# Patient Record
Sex: FEMALE | Race: WHITE | Employment: UNEMPLOYED | URBAN - METROPOLITAN AREA
[De-identification: names, ages, dates, MRNs, and addresses within clinical notes are randomized per-mention and may not be internally consistent; named-entity substitution may affect disease eponyms.]

---

## 2018-10-13 ENCOUNTER — HOSPITAL ENCOUNTER (OUTPATIENT)
Age: 23
Discharge: HOME | End: 2018-10-13
Attending: EMERGENCY MEDICINE
Payer: MEDICAID

## 2018-10-13 VITALS
DIASTOLIC BLOOD PRESSURE: 68 MMHG | TEMPERATURE: 98 F | WEIGHT: 166 LBS | BODY MASS INDEX: 28.34 KG/M2 | RESPIRATION RATE: 18 BRPM | SYSTOLIC BLOOD PRESSURE: 124 MMHG | OXYGEN SATURATION: 99 % | HEIGHT: 64 IN | HEART RATE: 70 BPM

## 2018-10-13 DIAGNOSIS — L01.00 IMPETIGO: Primary | ICD-10-CM

## 2018-10-13 DIAGNOSIS — H66.92 ACUTE LEFT OTITIS MEDIA: ICD-10-CM

## 2018-10-13 PROCEDURE — 99214 OFFICE O/P EST MOD 30 MIN: CPT

## 2018-10-13 PROCEDURE — 99213 OFFICE O/P EST LOW 20 MIN: CPT

## 2018-10-13 PROCEDURE — 81025 URINE PREGNANCY TEST: CPT

## 2018-10-13 RX ORDER — CLINDAMYCIN HYDROCHLORIDE 300 MG/1
300 CAPSULE ORAL 3 TIMES DAILY
Qty: 30 CAPSULE | Refills: 0 | Status: SHIPPED | OUTPATIENT
Start: 2018-10-13 | End: 2018-10-23

## 2018-10-13 RX ORDER — PREDNISONE 20 MG/1
40 TABLET ORAL DAILY
Qty: 6 TABLET | Refills: 0 | Status: SHIPPED | OUTPATIENT
Start: 2018-10-13 | End: 2018-10-16

## 2018-10-13 NOTE — ED PROVIDER NOTES
Patient Seen in: [facility]    History   Patient presents with:  Ear Pain  Cough    Stated Complaint: COUGH/EAR PAIN    HPI    The patient is a 70-year-old female with history of mild asthma, environmental allergies, comp air)       Current:/68   Pulse 70   Temp 98.2 °F (36.8 °C) (Oral)   Resp 18   Ht 162.6 cm (5' 4\")   Wt 75.3 kg   LMP 09/22/2018   SpO2 99%   Breastfeeding?  No   BMI 28.49 kg/m²         Physical Exam    Alert female no acute distress  HEENT: Normocep daily for 10 days. Qty: 30 capsule Refills: 0    predniSONE 20 MG Oral Tab  Take 2 tablets (40 mg total) by mouth daily for 3 days.   Qty: 6 tablet Refills: 0

## 2018-10-13 NOTE — ED INITIAL ASSESSMENT (HPI)
Pt to IC with cough x 2 weeks. Productive at times. Denies fever. Denies N/V/D. States he has developed clogged sensation in her left ear with left sided nasal congestion. Dr Lexie Greene at bedside.

## 2019-02-13 ENCOUNTER — HOSPITAL ENCOUNTER (OUTPATIENT)
Age: 24
Discharge: ANOTHER HOSPITAL | End: 2019-02-13
Attending: EMERGENCY MEDICINE
Payer: MEDICAID

## 2019-02-13 VITALS
BODY MASS INDEX: 26.46 KG/M2 | SYSTOLIC BLOOD PRESSURE: 145 MMHG | HEART RATE: 102 BPM | RESPIRATION RATE: 18 BRPM | DIASTOLIC BLOOD PRESSURE: 79 MMHG | TEMPERATURE: 98 F | WEIGHT: 155 LBS | OXYGEN SATURATION: 100 % | HEIGHT: 64 IN

## 2019-02-13 DIAGNOSIS — K04.7 DENTAL ABSCESS: Primary | ICD-10-CM

## 2019-02-13 PROCEDURE — 99212 OFFICE O/P EST SF 10 MIN: CPT

## 2019-02-14 ENCOUNTER — HOSPITAL ENCOUNTER (EMERGENCY)
Facility: HOSPITAL | Age: 24
Discharge: HOME | End: 2019-02-14
Attending: EMERGENCY MEDICINE
Payer: MEDICAID

## 2019-02-14 VITALS
HEART RATE: 88 BPM | SYSTOLIC BLOOD PRESSURE: 133 MMHG | DIASTOLIC BLOOD PRESSURE: 79 MMHG | OXYGEN SATURATION: 99 % | TEMPERATURE: 98 F | RESPIRATION RATE: 18 BRPM

## 2019-02-14 DIAGNOSIS — K04.7 DENTAL ABSCESS: Primary | ICD-10-CM

## 2019-02-14 PROCEDURE — 0C96XZZ DRAINAGE OF LOWER GINGIVA, EXTERNAL APPROACH: ICD-10-PCS | Performed by: EMERGENCY MEDICINE

## 2019-02-14 PROCEDURE — 41800 DRAINAGE OF GUM LESION: CPT

## 2019-02-14 PROCEDURE — 99284 EMERGENCY DEPT VISIT MOD MDM: CPT

## 2019-02-14 RX ORDER — HYDROCODONE BITARTRATE AND ACETAMINOPHEN 5; 325 MG/1; MG/1
2 TABLET ORAL ONCE
Status: COMPLETED | OUTPATIENT
Start: 2019-02-14 | End: 2019-02-14

## 2019-02-14 RX ORDER — HYDROCODONE BITARTRATE AND ACETAMINOPHEN 5; 325 MG/1; MG/1
1-2 TABLET ORAL EVERY 6 HOURS PRN
Qty: 10 TABLET | Refills: 0 | Status: SHIPPED | OUTPATIENT
Start: 2019-02-14 | End: 2019-04-05

## 2019-02-14 NOTE — ED PROVIDER NOTES
Patient Seen in: [facility] In [address] [place] [address]    History   Patient presents with:  Dental Problem (dental)    Stated Complaint: mouth swelling    HPI    Patient is a 27-year-old female with a history of impacted left lower wisdom tooth, h of the left lower molars  Pharynx:  left parapharyngeal swelling, uvula midline, no drooling, no stridor,  Patient has 2 cm trismus  Neck: Supple without palpable adenopathy or masses  Skin: No acute rash        ED Course   Labs Reviewed - No data to displ

## 2019-02-14 NOTE — ED INITIAL ASSESSMENT (HPI)
SAW DENTIST THIS AM AND TOLD SHE NEED ORAL SURG FOR IMPACTED WISDOM TOOTH PUT ON CLINDAMYCIN  AND T OOK MOTRIN BUT FEELS IT GETTING WORSE SCHEDULED FOR SURG ON Friday. ICE PACK TO LEFT JAW. LARGE SWELLING  LEFT JAW HARD PAIN UP TO EAR.

## 2019-02-15 ENCOUNTER — HOSPITAL ENCOUNTER (EMERGENCY)
Facility: HOSPITAL | Age: 24
Discharge: HOME | End: 2019-02-15
Attending: PHYSICIAN ASSISTANT
Payer: MEDICAID

## 2019-02-15 VITALS
OXYGEN SATURATION: 97 % | RESPIRATION RATE: 18 BRPM | TEMPERATURE: 98 F | SYSTOLIC BLOOD PRESSURE: 118 MMHG | HEART RATE: 76 BPM | DIASTOLIC BLOOD PRESSURE: 60 MMHG

## 2019-02-15 DIAGNOSIS — K91.840 SURGICAL WOUND HEMORRHAGE AFTER DENTAL PROCEDURE: Primary | ICD-10-CM

## 2019-02-15 DIAGNOSIS — Z98.818 SURGICAL WOUND HEMORRHAGE AFTER DENTAL PROCEDURE: Primary | ICD-10-CM

## 2019-02-15 PROCEDURE — 99282 EMERGENCY DEPT VISIT SF MDM: CPT

## 2019-02-15 NOTE — ED INITIAL ASSESSMENT (HPI)
Patient was seen yesterday was seen by the dentist for a wisdom tooth growing in that touching a nerve. Was given clidamycin. Went to IC last night as the swelling continued to get worse. The doctor tried to drain the swelling last night but was unable to.

## 2019-02-15 NOTE — ED PROVIDER NOTES
Patient Seen in: [facility] Emergency Department    History   Patient presents with:  Cellulitis (integumentary, infectious)      HPI    The patient presents to the ED complaining of worsening swelling to her left cheek and pain due to a tooth inf Oral   SpO2 98 %   O2 Device None (Room air)       Physical Exam   Constitutional: She is oriented to person, place, and time. She appears well-developed and well-nourished. No distress. HENT:   Head: Normocephalic and atraumatic.    Significant swelling aspiration performed and patient stable for discharge home with continued oral antibiotic use and follow-up with her orthodontist tomorrow. Procedure:  Abscess drainage:   The patient's abscess was located along her left lower mandible adjacent to her lo

## 2019-02-15 NOTE — ED INITIAL ASSESSMENT (HPI)
Pt states she just had dental procedure done at [facility] today and on her way home, she began bleeding and \"may have popped a stitch. \"

## 2019-02-16 NOTE — ED PROVIDER NOTES
Patient Seen in: [facility] Emergency Department    History   Patient presents with:  Dental Problem (dental)    Stated Complaint: dental complaint    CHELSIE    Naye Jones is a 24year old female who presents with chief complaint of oral bleed tobacco: Never Used    Alcohol use: No    Drug use: No      Review of Systems    Positive for stated complaint: dental complaint  Other systems are as noted in HPI. Constitutional and vital signs reviewed.       All other systems reviewed and negative exce movement is intact in all 4 extremities. Patient exhibits normal speech. Skin: Skin is normal to inspection, except as documented. Warm and dry. No obvious bruising. No obvious rash.     ED Course   Labs Reviewed - No data to display    MDM     Procedu

## 2019-02-16 NOTE — ED NOTES
Patient here after being at [facility] for dental surgery. Patient states she had a drain put in and that that she thinks she popped a stitch. Patient is afraid that she pulled out her drain.

## 2019-04-05 PROCEDURE — 88175 CYTOPATH C/V AUTO FLUID REDO: CPT | Performed by: FAMILY MEDICINE

## 2021-02-01 DIAGNOSIS — Z23 NEED FOR VACCINATION: ICD-10-CM

## 2021-02-12 ENCOUNTER — IMMUNIZATION (OUTPATIENT)
Dept: LAB | Facility: HOSPITAL | Age: 26
End: 2021-02-12
Attending: HOSPITALIST
Payer: COMMERCIAL

## 2021-02-12 DIAGNOSIS — Z23 NEED FOR VACCINATION: Primary | ICD-10-CM

## 2021-02-12 PROCEDURE — 0011A SARSCOV2 VAC 100MCG/0.5ML IM: CPT

## 2021-03-13 ENCOUNTER — IMMUNIZATION (OUTPATIENT)
Dept: LAB | Facility: HOSPITAL | Age: 26
End: 2021-03-13
Attending: EMERGENCY MEDICINE
Payer: COMMERCIAL

## 2021-03-13 DIAGNOSIS — Z23 NEED FOR VACCINATION: Primary | ICD-10-CM

## 2021-03-13 PROCEDURE — 0012A SARSCOV2 VAC 100MCG/0.5ML IM: CPT

## 2025-05-31 ENCOUNTER — HOSPITAL ENCOUNTER (INPATIENT)
Facility: HOSPITAL | Age: 30
LOS: 3 days | Discharge: HOME | End: 2025-06-03
Attending: EMERGENCY MEDICINE | Admitting: INTERNAL MEDICINE
Payer: COMMERCIAL

## 2025-05-31 ENCOUNTER — APPOINTMENT (OUTPATIENT)
Dept: CT IMAGING | Facility: HOSPITAL | Age: 30
End: 2025-05-31
Attending: EMERGENCY MEDICINE
Payer: COMMERCIAL

## 2025-05-31 ENCOUNTER — APPOINTMENT (OUTPATIENT)
Dept: MRI IMAGING | Facility: HOSPITAL | Age: 30
End: 2025-05-31
Attending: EMERGENCY MEDICINE
Payer: COMMERCIAL

## 2025-05-31 ENCOUNTER — APPOINTMENT (OUTPATIENT)
Dept: GENERAL RADIOLOGY | Facility: HOSPITAL | Age: 30
End: 2025-05-31
Attending: EMERGENCY MEDICINE
Payer: COMMERCIAL

## 2025-05-31 DIAGNOSIS — R56.9 SEIZURE (HCC): ICD-10-CM

## 2025-05-31 DIAGNOSIS — R41.82 ALTERED MENTAL STATUS, UNSPECIFIED ALTERED MENTAL STATUS TYPE: ICD-10-CM

## 2025-05-31 DIAGNOSIS — I61.9 CEREBRAL HEMORRHAGE (HCC): Primary | ICD-10-CM

## 2025-05-31 LAB
ALBUMIN SERPL-MCNC: 5.4 G/DL (ref 3.2–4.8)
ALBUMIN/GLOB SERPL: 1.9 {RATIO} (ref 1–2)
ALP LIVER SERPL-CCNC: 60 U/L (ref 37–98)
ALT SERPL-CCNC: 74 U/L (ref 10–49)
AMPHET UR QL SCN: NEGATIVE
ARTERIAL PATENCY WRIST A: POSITIVE
ARTERIAL PATENCY WRIST A: POSITIVE
AST SERPL-CCNC: 76 U/L (ref ?–34)
ATRIAL RATE: 118 BPM
BASE EXCESS BLDA CALC-SCNC: -7.5 MMOL/L (ref ?–2)
BASOPHILS # BLD: 0.19 X10(3) UL (ref 0–0.2)
BASOPHILS NFR BLD: 1 %
BENZODIAZ UR QL SCN: NEGATIVE
BILIRUB SERPL-MCNC: 0.3 MG/DL (ref 0.3–1.2)
BILIRUB UR QL STRIP.AUTO: NEGATIVE
BODY TEMPERATURE: 98.6 F
BODY TEMPERATURE: <70 F
BUN BLD-MCNC: 13 MG/DL (ref 9–23)
CA-I BLD-SCNC: 1.33 MMOL/L (ref 0.95–1.32)
CALCIUM BLD-MCNC: 10.3 MG/DL (ref 8.7–10.6)
CHLORIDE SERPL-SCNC: 105 MMOL/L (ref 98–112)
CLARITY UR REFRACT.AUTO: CLEAR
CO2 SERPL-SCNC: <10 MMOL/L (ref 21–32)
COCAINE UR QL: NEGATIVE
COHGB MFR BLD: 2.7 % SAT (ref 0–3)
COHGB MFR BLD: 4 % SAT (ref 0–3)
CREAT BLD-MCNC: 1.36 MG/DL (ref 0.55–1.02)
CREAT UR-SCNC: 53.6 MG/DL
EGFRCR SERPLBLD CKD-EPI 2021: 54 ML/MIN/1.73M2 (ref 60–?)
EOSINOPHIL # BLD: 0.58 X10(3) UL (ref 0–0.7)
EOSINOPHIL NFR BLD: 3 %
ERYTHROCYTE [DISTWIDTH] IN BLOOD BY AUTOMATED COUNT: 12.5 %
FENTANYL UR QL SCN: NEGATIVE
FIO2: 100 %
FIO2: 40 %
GLOBULIN PLAS-MCNC: 2.9 G/DL (ref 2–3.5)
GLUCOSE BLD-MCNC: 189 MG/DL (ref 70–99)
GLUCOSE BLD-MCNC: 269 MG/DL (ref 70–99)
GLUCOSE BLD-MCNC: 84 MG/DL (ref 70–99)
GLUCOSE UR STRIP.AUTO-MCNC: NORMAL MG/DL
HCO3 BLDA-SCNC: 19 MEQ/L (ref 21–27)
HCT VFR BLD AUTO: 53.8 % (ref 35–48)
HGB BLD-MCNC: 15.2 G/DL (ref 12–16)
HGB BLD-MCNC: 16.1 G/DL (ref 12–16)
HGB BLD-MCNC: 16.2 G/DL (ref 12–16)
KETONES UR STRIP.AUTO-MCNC: NEGATIVE MG/DL
LACTATE BLD-SCNC: 4.1 MMOL/L (ref 0.5–2)
LACTATE BLD-SCNC: >16.5 MMOL/L (ref 0.5–2)
LEUKOCYTE ESTERASE UR QL STRIP.AUTO: NEGATIVE
LYMPHOCYTES NFR BLD: 43 %
LYMPHOCYTES NFR BLD: 8.3 X10(3) UL (ref 1–4)
MCH RBC QN AUTO: 30.9 PG (ref 26–34)
MCHC RBC AUTO-ENTMCNC: 30.1 G/DL (ref 31–37)
MCV RBC AUTO: 102.5 FL (ref 80–100)
MDMA UR QL SCN: NEGATIVE
METHGB MFR BLD: 0.3 % SAT (ref 0.4–1.5)
METHGB MFR BLD: 0.6 % SAT (ref 0.4–1.5)
MONOCYTES # BLD: 0.77 X10(3) UL (ref 0.1–1)
MONOCYTES NFR BLD: 4 %
MORPHOLOGY: NORMAL
NEUTROPHILS # BLD AUTO: 7.73 X10 (3) UL (ref 1.5–7.7)
NEUTROPHILS NFR BLD: 44 %
NEUTS BAND NFR BLD: 5 %
NEUTS HYPERSEG # BLD: 9.46 X10(3) UL (ref 1.5–7.7)
NITRITE UR QL STRIP.AUTO: NEGATIVE
OPIATES UR QL SCN: NEGATIVE
OSMOLALITY SERPL CALC.SUM OF ELEC: 306 MOSM/KG (ref 275–295)
OXYCODONE UR QL SCN: NEGATIVE
OXYHGB MFR BLDA: 95.7 % (ref 92–100)
OXYHGB MFR BLDA: 95.8 % (ref 92–100)
P AXIS: 45 DEGREES
P-R INTERVAL: 180 MS
PCO2 BLDA: 37 MM HG (ref 35–45)
PCO2 BLDA: 46 MM HG (ref 35–45)
PEEP: 5 CM H2O
PEEP: 5 CM H2O
PH BLDA: 7.3 [PH] (ref 7.35–7.45)
PH BLDA: <6.81 [PH] (ref 7.35–7.45)
PH UR STRIP.AUTO: 7 [PH] (ref 5–8)
PLATELET # BLD AUTO: 375 10(3)UL (ref 150–450)
PLATELET MORPHOLOGY: NORMAL
PO2 BLDA: 115 MM HG (ref 80–100)
PO2 BLDA: 443 MM HG (ref 80–100)
POTASSIUM BLD-SCNC: 4.8 MMOL/L (ref 3.6–5.1)
POTASSIUM SERPL-SCNC: 4 MMOL/L (ref 3.5–5.1)
PROT SERPL-MCNC: 8.3 G/DL (ref 5.7–8.2)
PROT UR STRIP.AUTO-MCNC: 30 MG/DL
Q-T INTERVAL: 304 MS
QRS DURATION: 90 MS
QTC CALCULATION (BEZET): 426 MS
R AXIS: -18 DEGREES
RBC # BLD AUTO: 5.25 X10(6)UL (ref 3.8–5.3)
SODIUM BLD-SCNC: 134 MMOL/L (ref 135–145)
SODIUM SERPL-SCNC: 143 MMOL/L (ref 136–145)
SP GR UR STRIP.AUTO: 1.02 (ref 1–1.03)
T AXIS: 102 DEGREES
TIDAL VOLUME: 500 ML
TIDAL VOLUME: 500 ML
TOTAL CELLS COUNTED BLD: 100
TROPONIN I SERPL HS-MCNC: 25 NG/L (ref ?–34)
UROBILINOGEN UR STRIP.AUTO-MCNC: NORMAL MG/DL
VENT RATE: 16 /MIN
VENT RATE: 16 /MIN
VENTRICULAR RATE: 118 BPM
WBC # BLD AUTO: 19.3 X10(3) UL (ref 4–11)

## 2025-05-31 PROCEDURE — 70553 MRI BRAIN STEM W/O & W/DYE: CPT | Performed by: EMERGENCY MEDICINE

## 2025-05-31 PROCEDURE — 99291 CRITICAL CARE FIRST HOUR: CPT | Performed by: INTERNAL MEDICINE

## 2025-05-31 PROCEDURE — 5A1945Z RESPIRATORY VENTILATION, 24-96 CONSECUTIVE HOURS: ICD-10-PCS | Performed by: INTERNAL MEDICINE

## 2025-05-31 PROCEDURE — 99291 CRITICAL CARE FIRST HOUR: CPT | Performed by: EMERGENCY MEDICINE

## 2025-05-31 PROCEDURE — 71045 X-RAY EXAM CHEST 1 VIEW: CPT | Performed by: EMERGENCY MEDICINE

## 2025-05-31 PROCEDURE — 70450 CT HEAD/BRAIN W/O DYE: CPT | Performed by: EMERGENCY MEDICINE

## 2025-05-31 PROCEDURE — 99292 CRITICAL CARE ADDL 30 MIN: CPT | Performed by: INTERNAL MEDICINE

## 2025-05-31 PROCEDURE — 70546 MR ANGIOGRAPH HEAD W/O&W/DYE: CPT | Performed by: EMERGENCY MEDICINE

## 2025-05-31 PROCEDURE — 70549 MR ANGIOGRAPH NECK W/O&W/DYE: CPT | Performed by: EMERGENCY MEDICINE

## 2025-05-31 RX ORDER — ACETAMINOPHEN 325 MG/1
650 TABLET ORAL EVERY 4 HOURS PRN
Status: DISCONTINUED | OUTPATIENT
Start: 2025-05-31 | End: 2025-06-01

## 2025-05-31 RX ORDER — PROCHLORPERAZINE EDISYLATE 5 MG/ML
5 INJECTION INTRAMUSCULAR; INTRAVENOUS EVERY 8 HOURS PRN
Status: DISCONTINUED | OUTPATIENT
Start: 2025-05-31 | End: 2025-06-01

## 2025-05-31 RX ORDER — DEXMEDETOMIDINE HYDROCHLORIDE 4 UG/ML
INJECTION, SOLUTION INTRAVENOUS CONTINUOUS
Status: DISCONTINUED | OUTPATIENT
Start: 2025-05-31 | End: 2025-06-02

## 2025-05-31 RX ORDER — HYDROCODONE BITARTRATE AND ACETAMINOPHEN 5; 325 MG/1; MG/1
1 TABLET ORAL EVERY 4 HOURS PRN
Status: DISCONTINUED | OUTPATIENT
Start: 2025-05-31 | End: 2025-06-01

## 2025-05-31 RX ORDER — INDOMETHACIN 25 MG/1
50 CAPSULE ORAL ONCE
Status: COMPLETED | OUTPATIENT
Start: 2025-05-31 | End: 2025-05-31

## 2025-05-31 RX ORDER — SENNOSIDES 8.6 MG
17.2 TABLET ORAL NIGHTLY PRN
Status: DISCONTINUED | OUTPATIENT
Start: 2025-05-31 | End: 2025-06-03

## 2025-05-31 RX ORDER — BISACODYL 10 MG
10 SUPPOSITORY, RECTAL RECTAL
Status: DISCONTINUED | OUTPATIENT
Start: 2025-05-31 | End: 2025-06-03

## 2025-05-31 RX ORDER — ONDANSETRON 2 MG/ML
4 INJECTION INTRAMUSCULAR; INTRAVENOUS EVERY 6 HOURS PRN
Status: DISCONTINUED | OUTPATIENT
Start: 2025-05-31 | End: 2025-06-01

## 2025-05-31 RX ORDER — SODIUM CHLORIDE 9 MG/ML
INJECTION, SOLUTION INTRAVENOUS CONTINUOUS
Status: DISCONTINUED | OUTPATIENT
Start: 2025-05-31 | End: 2025-06-02

## 2025-05-31 RX ORDER — POLYETHYLENE GLYCOL 3350 17 G/17G
17 POWDER, FOR SOLUTION ORAL DAILY PRN
Status: DISCONTINUED | OUTPATIENT
Start: 2025-05-31 | End: 2025-06-03

## 2025-05-31 RX ORDER — BUSPIRONE HYDROCHLORIDE 15 MG/1
15 TABLET ORAL 4 TIMES DAILY
COMMUNITY

## 2025-05-31 RX ORDER — MORPHINE SULFATE 2 MG/ML
1 INJECTION, SOLUTION INTRAMUSCULAR; INTRAVENOUS EVERY 2 HOUR PRN
Status: DISCONTINUED | OUTPATIENT
Start: 2025-05-31 | End: 2025-06-02

## 2025-05-31 RX ORDER — MORPHINE SULFATE 4 MG/ML
4 INJECTION, SOLUTION INTRAMUSCULAR; INTRAVENOUS EVERY 2 HOUR PRN
Status: DISCONTINUED | OUTPATIENT
Start: 2025-05-31 | End: 2025-06-02

## 2025-05-31 RX ORDER — SODIUM CHLORIDE 9 MG/ML
INJECTION, SOLUTION INTRAVENOUS CONTINUOUS
Status: DISCONTINUED | OUTPATIENT
Start: 2025-05-31 | End: 2025-05-31

## 2025-05-31 RX ORDER — MORPHINE SULFATE 2 MG/ML
2 INJECTION, SOLUTION INTRAMUSCULAR; INTRAVENOUS EVERY 2 HOUR PRN
Status: DISCONTINUED | OUTPATIENT
Start: 2025-05-31 | End: 2025-06-02

## 2025-05-31 RX ORDER — SODIUM CHLORIDE 9 MG/ML
125 INJECTION, SOLUTION INTRAVENOUS CONTINUOUS
Status: DISCONTINUED | OUTPATIENT
Start: 2025-05-31 | End: 2025-05-31

## 2025-05-31 RX ORDER — DULOXETIN HYDROCHLORIDE 30 MG/1
CAPSULE, DELAYED RELEASE ORAL DAILY
Status: ON HOLD | COMMUNITY
End: 2025-06-03

## 2025-05-31 RX ORDER — DEXMEDETOMIDINE HYDROCHLORIDE 4 UG/ML
INJECTION, SOLUTION INTRAVENOUS CONTINUOUS
Status: DISCONTINUED | OUTPATIENT
Start: 2025-05-31 | End: 2025-05-31

## 2025-05-31 RX ORDER — ACETAMINOPHEN 500 MG
500 TABLET ORAL EVERY 4 HOURS PRN
Status: DISCONTINUED | OUTPATIENT
Start: 2025-05-31 | End: 2025-06-01

## 2025-05-31 RX ORDER — SODIUM PHOSPHATE, DIBASIC AND SODIUM PHOSPHATE, MONOBASIC 7; 19 G/230ML; G/230ML
1 ENEMA RECTAL ONCE AS NEEDED
Status: DISCONTINUED | OUTPATIENT
Start: 2025-05-31 | End: 2025-06-03

## 2025-05-31 RX ORDER — LEVETIRACETAM 500 MG/5ML
1000 INJECTION, SOLUTION, CONCENTRATE INTRAVENOUS EVERY 12 HOURS
Status: DISCONTINUED | OUTPATIENT
Start: 2025-06-01 | End: 2025-06-03

## 2025-05-31 RX ORDER — HYDROCODONE BITARTRATE AND ACETAMINOPHEN 5; 325 MG/1; MG/1
2 TABLET ORAL EVERY 4 HOURS PRN
Status: DISCONTINUED | OUTPATIENT
Start: 2025-05-31 | End: 2025-06-01

## 2025-05-31 RX ORDER — GADOTERATE MEGLUMINE 376.9 MG/ML
20 INJECTION INTRAVENOUS
Status: COMPLETED | OUTPATIENT
Start: 2025-05-31 | End: 2025-05-31

## 2025-05-31 RX ORDER — MIDAZOLAM HYDROCHLORIDE 1 MG/ML
4 INJECTION INTRAMUSCULAR; INTRAVENOUS ONCE
Status: COMPLETED | OUTPATIENT
Start: 2025-05-31 | End: 2025-05-31

## 2025-05-31 RX ORDER — ROCURONIUM BROMIDE 10 MG/ML
INJECTION, SOLUTION INTRAVENOUS
Status: COMPLETED | OUTPATIENT
Start: 2025-05-31 | End: 2025-05-31

## 2025-05-31 RX ORDER — MIDAZOLAM HYDROCHLORIDE 1 MG/ML
INJECTION INTRAMUSCULAR; INTRAVENOUS
Status: COMPLETED
Start: 2025-05-31 | End: 2025-05-31

## 2025-05-31 NOTE — SPIRITUAL CARE NOTE
Spiritual Care Visit Note    Patient Name: Claire Goff Date of Spiritual Care Visit: 24   : 1995 Primary Dx: <principal problem not specified>       Referred By:      Spiritual Care Taxonomy:    Intended Effects: Helping someone feel comforted    Methods: Offer support    Interventions: Provide compassionate touch, Respond as  to a defined crisis event, Explain  role, Provide hospitality    Visit Type/Summary:     - Spiritual Care:  Jaime names/describes feeling shocked and trying to understand how this happened to wife, He was very tearful and   offered support and information as needed.  Jaime said they were not people of [place] and expressed appreciation for  visit.  remains available as needed for follow up.    Spiritual Care support can be requested via an Epic consult. For urgent/immediate needs, please contact the On Call  at: [facility]: ext [phone]

## 2025-05-31 NOTE — H&P
[facility]  History and Physical     Claire Goff Patient Status:  Emergency    1995 MRN [id number]   [facility] EMERGENCY DEPARTMENT Attending Benoit Lawrence MD   Hosp Day # 0 PCP Siobhan Sierra MD     Chief Complaint:   Found unresponsive, cardiac arrest    History of Present Illness: Claire Goff is a 29 year old female with PMH significant for anxiety, asthma, depression, presents with her family after being found unresponsive, with possible cardiac arrest.  Patient has a remote history of drug abuse, concern for overdose.  Per family patient has not used in 10 years.  Patient was moving with her  and was in a normal state of health laughing joking driving a car, parked and  arrived 5 to 10 minutes after she had.  In the car patient was known to be grimacing, blood coming out of mouth, not responsive, with upper extremity posturing.  911 was called and CPR was started, unclear if pulse was present or not but patient was intubated in the field.  She was given Narcan without any relief.   reports that patient's father  last week and this is caused patient significant anxiety with sadness.  He was on Xanax and he thinks that the patient may have taken a few Xanax from her father's supply.  In the ED CT head shows left occipital lobe hyperdensity which may represent hemorrhagic lesion.  With asymmetric hypodensity in the right area possible subdural.  Routine labs show creatinine 1.36, ABG with pH of 6.81, lactic acid on ABG 16.5, WBC 19.3, hemoglobin 16.2.  Neurosurgery and neurocritical care were consulted.  Patient transferred to ICU in stable condition on ventilator.  Plans for MRI/MRA.  Family at bedside in the ED.    Past Medical History:Past Medical History[1]     Past Surgical History: Past Surgical History[2]    Social History:  reports that she quit smoking about 8 years ago. She started smoking about 12 years ago. She has a 2 pack-year  smoking history. She has never used smokeless tobacco. She reports current drug use. Drug: Cannabis. She reports that she does not drink alcohol.    Family History: Family History[3]     Allergies: Allergies[4]    Medications:  Medications Ordered Prior to Encounter[5]    Review of Systems:   A comprehensive 14 point review of systems was completed.    Pertinent positives and negatives noted in the HPI.    Physical Exam:    BP (!) 133/95   Pulse 75   Temp 97.4 °F (36.3 °C) (Rectal)   Resp 16   Ht 5' 7\" (1.702 m)   Wt 198 lb 6.6 oz (90 kg)   SpO2 100%   BMI 31.08 kg/m²   General: intubated, sedated  HEENT: Normocephalic atraumatic. Moist mucous membranes. EOM-I. PERRLA. Anicteric.  Neck: No lymphadenopathy. No JVD. No carotid bruits.  Respiratory: Clear to auscultation bilaterally. No wheezes. No rhonchi.  Cardiovascular: S1, S2. Regular rate and rhythm. No murmurs, rubs or gallops. Equal pulses.   Chest and Back: No tenderness or deformity.  Abdomen: Soft, nontender, nondistended.  Positive bowel sounds. No rebound, guarding or organomegaly.      Diagnostic Data:      Labs:  Recent Labs   Lab 05/31/25  0953   WBC 19.3*   HGB 16.2*   .5*   .0   BAND 5       Recent Labs   Lab 05/31/25  0954   *   BUN 13   CREATSERUM 1.36*   CA 10.3   ALB 5.4*      K 4.0      CO2 <10.0*   ALKPHO 60   AST 76*   ALT 74*   BILT 0.3   TP 8.3*       Estimated Creatinine Clearance: 58.8 mL/min (A) (based on SCr of 1.36 mg/dL (H)).    No results for input(s): \"PTP\", \"INR\" in the last 168 hours.    COVID-19 Lab Results    COVID-19  No results found for: \"COVID19\"    Pro-Calcitonin  No results for input(s): \"PCT\" in the last 168 hours.    Cardiac  No results for input(s): \"TROP\", \"PBNP\" in the last 168 hours.    Creatinine Kinase  No results for input(s): \"CK\" in the last 168 hours.    Inflammatory Markers  No results for input(s): \"CRP\", \"AREN\", \"LDH\", \"DDIMER\" in the last 168 hours.    Recent Labs   Lab  05/31/25  0954   TROPHS 25       Imaging: Imaging data reviewed in Epic.      ASSESSMENT / PLAN:     Claire Goff is a 29 year old female with PMH significant for anxiety, asthma, depression, presents with her family after being found unresponsive, with possible cardiac arrest.      Unresponsive in field, possible cardiac arrest   Acute hypoxic resp failure, unable to protect airway -intubated, sedated  -CPR was done in field, unclear how long, patient intubated in field  -Wean sedation, wean ventilator if possible  - Supportive care    New seizure-like activity  -Found unresponsive with generalized tonic-clonic activity  -Loaded with Keppra in ED, continue for now  -Check EEG  -Frequent neurochecks  -PT/OT/ST  -Neurocritical care consulted-appreciate recommendations    New left and right hypodensities-concern for hemorrhage/SDH  - CT reviewed  -MRI/MRA pending  -Neurosurgery consulted-appreciate recommendations  -Neurocritical care following    Resp / metabolic acidosis  Lactic acidosis  -ABD shows pH 6.8 on admission to ED >> with vent mx improved to 7.30  -LA >16 >> 4.1  -monitor O2 and Cos  -renal function appears healthy  -monitor urine output      Quality:  DVT Prophylaxis: scds  CODE status: FULL CODE  Liriano: no  If COVID testing is negative, may discontinue isolation: yes     Plan of care discussed with patients family, all questions answered.        Caitlin Foss MD  [facility] Hospitalist  Pager [phone]  Answering Service number: [phone]                          [1]   Past Medical History:   Anxiety state, unspecified    Asthma (HCC)    Asthma, mild intermittent (HCC)    Depression    Medication    Extrinsic asthma, unspecified    Positive PPD, treated    rifampin 6 months at HD   [2]   Past Surgical History:  Procedure Laterality Date    Other surgical history Right     4 surgeries to eye   [3]   Family History  Problem Relation Age of Onset    Cancer Mother         breast ca before 48- not sure  age.    Other (Other) Mother          of etoh cirrhosis at 48; brain aneurysm or trauma to brain after a fall- unclear    Psychiatric Brother         anxiety depression    Diabetes Father         type 2 insulin req    Heart Disorder Father         chf in 50s/cad    Hypertension Father     Lipids Father     Psychiatric Father         depression. bipolar. anxiety    Other (Other) Father         asthma/copd/severe spine arthritis and knees.    Cancer Paternal Grandmother         breast    Cancer Other         m aunt breast ca   [4]   Allergies  Allergen Reactions    Penicillins     Sulfa Antibiotics    [5]   No current facility-administered medications on file prior to encounter.     No current outpatient medications on file prior to encounter.

## 2025-05-31 NOTE — PLAN OF CARE
Pt arrived from ED after MRI, pt sedated and intubated, moves spontaneously all extremities but not to command.  VSS,   NGT to Heri ENGLE for strict I&O  Fentanyl discontinued and PRopofol and Precedex infusing and titrating as indicated     Family at bedside and updated on pt POC, condition and progress      Problem: Patient/Family Goals  Goal: Patient/Family Long Term Goal  Description: Patient's Long Term Goal: discharge home    Interventions:  - remain seizure free  -monitor for s/s of seizures   Increase activity as able  - See additional Care Plan goals for specific interventions  Outcome: Not Progressing  Goal: Patient/Family Short Term Goal  Description: Patient's Short Term Goal: maintain comfort     Interventions:   - monitor for s/s of pain/discomfort  -turn patient every 2 hrs   - give prn pain medication as indicated  - See additional Care Plan goals for specific interventions  Outcome: Not Progressing

## 2025-05-31 NOTE — ED QUICK NOTES
RN accompanied patient out of department for testing- MRI. Patients family went to 6th floor waiting room area to while pt is in mri. RT, 2 pcts, RN accompanied patient in MRI.

## 2025-05-31 NOTE — CONSULTS
[facility]  Neurocritical Care Consult Note    Claire Goff Patient Status:  Emergency    1995 MRN [id number]   Location [facility] EMERGENCY DEPARTMENT Attending Benoit Lawrence MD   Hosp Day # 0 PCP Siobhan Sierra MD       Reason for Consultation:   seizure-like activity    HPI:   Patient is a 29 year old female with a h/o asthma, anxiety do, depression, and polysub abuse p/w seizure-like activity . Pt was found unresponsive by family with generalized tonic-clonic activity, intubated for airway protection per EMS and brought to ED where w/u revealed ct head with 11mm L occ hyperdensity and possible R tentorial sdh, thus pt loaded with keppra, and transferred to cnicu for further monitoring.   Per  no h/o seizures, no recent illness sign/symptoms, does report increased stress due to pts father passing away recently, no recent drug use.    Past Medical History:    Anxiety state, unspecified    Asthma (HCC)    Asthma, mild intermittent (HCC)    Depression    Medication    Extrinsic asthma, unspecified    Positive PPD, treated    rifampin 6 months at HD       Past Surgical History:   Procedure Laterality Date    Other surgical history Right     4 surgeries to eye       (Not in a hospital admission)    Allergies   Allergen Reactions    Penicillins     Sulfa Antibiotics      Social History     Socioeconomic History    Marital status:    Tobacco Use    Smoking status: Former     Current packs/day: 0.00     Average packs/day: 0.5 packs/day for 4.0 years (2.0 ttl pk-yrs)     Types: Cigarettes     Start date: 2013     Quit date: 2017     Years since quittin.1    Smokeless tobacco: Never   Vaping Use    Vaping status: Never Used   Substance and Sexual Activity    Alcohol use: No    Drug use: Yes     Types: Cannabis     Comment: mj at night only     Family History   Problem Relation Age of Onset    Cancer Mother         breast ca before 48- not sure age.     Other (Other) Mother          of etoh cirrhosis at 48; brain aneurysm or trauma to brain after a fall- unclear    Psychiatric Brother         anxiety depression    Diabetes Father         type 2 insulin req    Heart Disorder Father         chf in 50s/cad    Hypertension Father     Lipids Father     Psychiatric Father         depression. bipolar. anxiety    Other (Other) Father         asthma/copd/severe spine arthritis and knees.    Cancer Paternal Grandmother         breast    Cancer Other         m aunt breast ca       Current Meds:  Current Facility-Administered Medications   Medication Dose Route Frequency    sodium chloride 0.9% infusion  125 mL/hr Intravenous Continuous    norepinephrine (Levophed) 4 mg/250mL infusion premix  0.5-50 mcg/min Intravenous Continuous    levETIRAcetam (Keppra) 3,500 mg in sodium chloride 0.9% 100 mL IVPB  40 mg/kg Intravenous Once       Review of Systems:     Constitutional:    Denies unusual weight loss or weight gain, fever/chills or night sweats.  HEENT:                Denies changes in vision or difficulty swallowing.  Pulm:                    Denies dyspnea, cough, or sputum production  Cardiac:               Denies chest pain, palpitations or lower extremity edema.  GI:                         Denies constipation, heartburn or melena.  :                       Denies dysuria or hematuria.  Skin:                     Denies rashes or open areas.  Neuro:                  As per HPI    All other systems were reviewed and are negative.    Vitals:   Temp:  [97.4 °F (36.3 °C)] 97.4 °F (36.3 °C)  Pulse:  [] 99  Resp:  [16-23] 19  BP: (108-165)/(77-95) 108/77  SpO2:  [94 %-100 %] 98 %  FiO2 (%):  [40 %-100 %] 40 %  Body mass index is 31.08 kg/m².    Intake/Output:  No intake or output data in the 24 hours ending 25 1131    Physical Examination:   General- intubated and sedated  CV- RRR  Resp- coarse mechanical breath sounds bilat  Neuro-  Mental status- Does not  open eyes to voice or stim, does not regard or follow commands  Cranial nerves- pupils equally round and reactive to light, +corneal/cough  Motor/Sens- min w/d x4    Diagnostics:   CT BRAIN OR HEAD (CPT=70450)  Result Date: 5/31/2025  CONCLUSION:   1. There is a hyperdensity at the left occipital lobe measuring 11 x 10 mm.  This may represent acute blood products or a hemorrhagic lesion.  A contrast-enhanced brain MRI with and without intravenous contrast is recommended for further assessment.   2. Asymmetric hyperdensity at the right tentorial leaflet may represent a subdural hemorrhage.  This can also be further assessed on the subsequent brain MRI.   Critical results were discussed with Dr. Lawrence at 1120 hours on 5/31/2025. Critical results were read back.     LOCATION:  [facility]   Dictated by (CST): Stromberg, LeRoy, MD on 5/31/2025 at 11:14 AM     Finalized by (CST): Stromberg, LeRoy, MD on 5/31/2025 at 11:21 AM       XR CHEST AP PORTABLE  (CPT=71045)  Result Date: 5/31/2025  CONCLUSION:  Heart size within normal limits.  Symmetric perihilar interstitial opacities may reflect pulmonary edema or inflammatory process.  No discrete airspace consolidation.  The pleural spaces are clear.  Endotracheal tube tip terminates approximately 4.5 cm from the tawanna.  Enteric catheter terminates in the expected location of the gastric body.   LOCATION:  [facility]      Dictated by (CST): Hardik Dunlap MD on 5/31/2025 at 10:22 AM     Finalized by (CST): Hardik Dunlap MD on 5/31/2025 at 10:23 AM         Lab Review     Recent Labs   Lab 05/31/25  0954      K 4.0      CO2 <10.0*   *   BUN 13   CREATSERUM 1.36*     Recent Labs   Lab 05/31/25  0953   WBC 19.3*   HGB 16.2*   .0       Assesment/Plan:     Neuro:  New onset seizure-like activity- pt reportedly found unresponsive with generalized tonic-clonic activity.   Pt with risk factors of stressors, h/o polysub abuse (though denies any current use), and new L occ  hyperdensity on CT.   Will cont keppra for seizure prophy and check vEEG for further eval.   Cont neurochecks/pt/ot/st.  L occ hyperdensity and possible R tentorial SDH- will check mri brain w/wo for further eval, differential includes hemorrhage vs underlying mass/infxn.   ICH score= 1 for GCS.  Depression/anxiety- per IM  Cardiac:  sbp goal 100-150  Pulmonary:  Resp failure- 2/2 above, vent management per pulm  Renal:  MERCEDEZ- IVFs, monitor BUN/Cr  GI:  npo  Heme/ID:  Afebrile, trend leukocytosis. May consider csf analysis pending mri  Endocrine/Rheum:  Monitor glucose, sliding scale insulin prn  DVT Prophylaxis:  SCD’s  Upon my evaluation, this patient had a high probability of imminent or life-threatening deterioration due to central nervous system failure, seizure, and subdural hematoma, which required my direct attention, intervention, and personal management.    I have personally provided 80 minutes of critical care time, exclusive of time spent on separately billable procedures.  Time includes review of all pertinent laboratory/radiology results, discussion with consultants, and monitoring for potential decompensation.  Performed interventions included vEEG, AED.     Thank you for allowing me to participate in the care of this patient.     Ahsan Olivia MD, [facility]  Medical Director of System Neurosciences  Chief, Section of Neurocritical Care  [facility]

## 2025-05-31 NOTE — ED PROVIDER NOTES
Patient Seen in: [facility] Emergency Department        History  Chief Complaint   Patient presents with    Cardiac Arrest     Stated Complaint:     Subjective:   HPI            29-year-old female who was found outside unresponsive who per family is known to be an abuser of benzodiazepines brought here for arrest and possible overdose.  Patient was not protecting her airway and breathing very shallowly in the field and was intubated prior to arrival here.  CPR was done but this was done prior to patient being monitored.  Patient was given Narcan without significant relief in the field as well.  Patient was given benzodiazepines IV prior to coming to the ER per paramedics.  Paramedics were interviewed for this.  The  did arrive and I interviewed him as well.  He states she at one time had been addicted to opiates but has not used any in 10 years.  He does report that the patient's father had  the other week which is caused some significant anxiety and sadness.  He was on Xanax and he thinks he may have taken the medications but he is unclear.  He states that she was doing well all morning and had been away from her for about 20 minutes when he found her slumped over in the truck.  In addition to this when the  did not get down to the truck he noticed that her jaw was clenched and she was moving her jaw strangely.  She has no history of having seizures in the past.      Objective:     Past Medical History:    Anxiety state, unspecified    Asthma (HCC)    Asthma, mild intermittent (HCC)    Depression    Medication    Extrinsic asthma, unspecified    Positive PPD, treated    rifampin 6 months at HD              Past Surgical History:   Procedure Laterality Date    Other surgical history Right     4 surgeries to eye                Social History     Socioeconomic History    Marital status:    Tobacco Use    Smoking status: Former     Current packs/day: 0.00     Average packs/day: 0.5  packs/day for 4.0 years (2.0 ttl pk-yrs)     Types: Cigarettes     Start date: 2013     Quit date: 2017     Years since quittin.1    Smokeless tobacco: Never   Vaping Use    Vaping status: Never Used   Substance and Sexual Activity    Alcohol use: No    Drug use: Yes     Types: Cannabis     Comment: mj at night only                                Physical Exam    ED Triage Vitals [25 0955]   /87   Pulse 113   Resp 23   Temp 97.4 °F (36.3 °C)   Temp src Rectal   SpO2 100 %   O2 Device Ventilator       Current Vitals:   Vital Signs  BP: 116/82  Pulse: 79  Resp: 16  Temp: 97.4 °F (36.3 °C)  Temp src: Rectal  MAP (mmHg): 93    Oxygen Therapy  SpO2: 100 %  O2 Device: Ventilator  FiO2 (%): (S) 40 %            Physical Exam  HEENT : NCAT, mid pupils noted,  neck supple, no JVD, trachea midline, No LAD  Heart: S1S2 normal. No murmurs, regular rate and rhythm  Lungs: Clear to auscultation bilaterally with shallow and slow respirations noted over bagging  Abdomen: Soft nontender nondistended normal active bowel sounds without rebound, guarding or masses noted  Extremity no clubbing, cyanosis or edema noted.   Neuro: Patient been premedicated at this time and not responsive    All measures to prevent infection transmission during my interaction with the patient were taken.  The patient was already wearing droplet mask on my arrival to the room.  Personal protective equipment including a droplet mask as well as gloves were worn throughout the duration of my exam.  Hand washing was performed prior to and after the exam.  Stethoscope and equipment used during my examination was cleaned with a super Sani cloth germicidal wipe following the exam.        ED Course  Labs Reviewed   COMP METABOLIC PANEL (14) - Abnormal; Notable for the following components:       Result Value    Glucose 269 (*)     CO2 <10.0 (*)     Creatinine 1.36 (*)     Calculated Osmolality 306 (*)     eGFR-Cr 54 (*)     AST 76 (*)     ALT  74 (*)     Total Protein 8.3 (*)     Albumin 5.4 (*)     All other components within normal limits   CBC WITH DIFFERENTIAL WITH PLATELET - Abnormal; Notable for the following components:    WBC 19.3 (*)     HGB 16.2 (*)     HCT 53.8 (*)     .5 (*)     MCHC 30.1 (*)     Neutrophil Absolute Prelim 7.73 (*)     All other components within normal limits   URINALYSIS, ROUTINE - Abnormal; Notable for the following components:    Blood Urine 1+ (*)     Protein Urine 30 (*)     RBC Urine 3-5 (*)     Squamous Epi. Cells Few (*)     All other components within normal limits   ABG PANEL W ELECT AND LACTATE - Abnormal; Notable for the following components:    ABG pH <6.81 (*)     ABG pCO2 46 (*)     ABG pO2 443 (*)     Total Hemoglobin 16.1 (*)     Carboxyhemoglobin 4.0 (*)     Methemoglobin 0.3 (*)     Ionized Calcium 1.33 (*)     Sodium Blood Gas 134 (*)     Lactic Acid (Blood Gas) >16.5 (*)     All other components within normal limits   DRUG SCREEN 8 W/OUT CONFIRMATION, URINE - Abnormal; Notable for the following components:    Cannabinoid Urine Presumed Positive (*)     All other components within normal limits    Narrative:     Results of the Urine Drug Screen should be used only for medical purposes.   MANUAL DIFFERENTIAL - Abnormal; Notable for the following components:    Neutrophil Absolute Manual 9.46 (*)     Lymphocyte Absolute Manual 8.30 (*)     All other components within normal limits   ABG PANEL W ELECT AND LACTATE - Abnormal; Notable for the following components:    ABG pH 7.30 (*)     ABG pO2 115 (*)     ABG HCO3 19.0 (*)     ABG Base Excess -7.5 (*)     Lactic Acid (Blood Gas) 4.1 (*)     All other components within normal limits   POCT GLUCOSE - Abnormal; Notable for the following components:    POC Glucose 189 (*)     All other components within normal limits   TROPONIN I HIGH SENSITIVITY - Normal   PATH COMMENT CBC   RAINBOW DRAW LIGHT GREEN   RAINBOW DRAW BLUE   RAINBOW DRAW GOLD   RAINBOW DRAW  LAVENDER     EKG    Rate, intervals and axes as noted on EKG Report.  Rate: 118  Rhythm: Sinus Rhythm  Reading: , QRS of 90, patient has sinus tachycardia with LVH noted at this time.           ED Course as of 05/31/25 1229  ------------------------------------------------------------  Time: 05/31 1223  Comment: Upon arrival the patient was intubated.  I did note color check and breath sounds being equal.  The patient had an initial blood gas with a pH of less than 6.8 with a lactic acid level of greater than 16.6.  The patient had IV fluids given and was started on propofol for sedation and fentanyl as well as IV fluids.  Her drug screen only showed cannabis.  Her initial electrolytes had a CO2 of less than 10 and a creatinine 1.36.  Patient's white count was 19.3.  The patient underwent a chest x-ray that appears interpreted showing no acute pneumonia.  This was by my interpretation.  I reviewed the radiology report as well.  The patient had a CT brain showing a left occipital hemorrhage.  In addition there may be a subdural component.  The patient will have an MRI/MRA of the brain to better identify.  While here I spoke with pulmonology intensivist Dr. Baumann as well as Dr. Ross the neurointensivist as well as Dr. Malone from neurosurgery as well as the hospitalist.  I initially did speak with the intensivist from the ICU who saw the patient as well.  The patient will be admitted to the neuro ICU at this time.     CT BRAIN OR HEAD (CPT=70450)  Result Date: 5/31/2025  PROCEDURE:  CT BRAIN OR HEAD (55638)  COMPARISON:  None.  INDICATIONS:  ms change  TECHNIQUE:  Noncontrast CT scanning is performed through the brain. Dose reduction techniques were used. Dose information is transmitted to the ACR (American College of Radiology) NRDR (National Radiology Data Registry) which includes the Dose Index Registry.  PATIENT STATED HISTORY: (As transcribed by Technologist)  Found on floor unresponsive. Cardiac  arrest, possible seizure activity.    FINDINGS:   There is a hyperdensity at the left occipital lobe measuring 11 x 10 mm.  This may represent acute blood products or a hemorrhagic lesion.  A contrast-enhanced brain MRI with and without intravenous contrast is recommended for further assessment.   Asymmetric hyperdensity at the right tentorial leaflet may represent a dominant transverse sinus versus subdural hemorrhage.  This can also be further assessed on the subsequent brain MRI.  No significant mass effect or midline shift.  No hydrocephalus.  No extra-axial fluid is seen.  The paranasal sinuses are clear.            CONCLUSION:   1. There is a hyperdensity at the left occipital lobe measuring 11 x 10 mm.  This may represent acute blood products or a hemorrhagic lesion.  A contrast-enhanced brain MRI with and without intravenous contrast is recommended for further assessment.   2. Asymmetric hyperdensity at the right tentorial leaflet may represent a subdural hemorrhage.  This can also be further assessed on the subsequent brain MRI.   Critical results were discussed with Dr. Lawrence at 1120 hours on 5/31/2025. Critical results were read back.     LOCATION:  [facility]   Dictated by (CST): Stromberg, LeRoy, MD on 5/31/2025 at 11:14 AM     Finalized by (CST): Stromberg, LeRoy, MD on 5/31/2025 at 11:21 AM       XR CHEST AP PORTABLE  (CPT=71045)  Result Date: 5/31/2025  PROCEDURE:  XR CHEST AP PORTABLE  (CPT=71045)  TECHNIQUE:  AP chest radiograph was obtained.  COMPARISON:  None.  INDICATIONS:  intubated  PATIENT STATED HISTORY: (As transcribed by Technologist)  ET and OG tube verification    FINDINGS:             CONCLUSION:  Heart size within normal limits.  Symmetric perihilar interstitial opacities may reflect pulmonary edema or inflammatory process.  No discrete airspace consolidation.  The pleural spaces are clear.  Endotracheal tube tip terminates approximately 4.5 cm from the tawanna.  Enteric catheter  terminates in the expected location of the gastric body.   LOCATION:  [facility]      Dictated by (CST): Hardik Dunlap MD on 5/31/2025 at 10:22 AM     Finalized by (CST): Hardik Dunlap MD on 5/31/2025 at 10:23 AM       Medications   sodium chloride 0.9% infusion (125 mL/hr Intravenous New Bag 5/31/25 1014)   norepinephrine (Levophed) 4 mg/250mL infusion premix (has no administration in time range)   fentaNYL in sodium chloride 0.9% (Sublimaze) 1000 mcg/100mL infusion premix (100 mcg/hr Intravenous New Bag 5/31/25 1200)   rocuronium (Zemuron) 50 mg/5mL injection (50 mg Intravenous Given 5/31/25 0948)   propofol (Diprivan) 10 mg/mL infusion premix (75 mcg/kg/min × 90 kg (Order-Specific) Intravenous Rate/Dose Change 5/31/25 1138)   sodium bicarbonate injection 50 mEq (50 mEq Intravenous Given 5/31/25 1017)   midazolam (Versed) 2 MG/2ML injection 4 mg (4 mg Intravenous Given 5/31/25 1112)   levETIRAcetam (Keppra) 3,500 mg in sodium chloride 0.9% 100 mL IVPB (0 mg Intravenous Stopped 5/31/25 1208)   fentaNYL (Sublimaze) 50 mcg/mL injection 50 mcg (50 mcg Intravenous Given 5/31/25 1128)                       MDM     Differential diagnosis includes seizure, cerebral mass, cerebral hemorrhage, overdose but limited such.  I have low suspicion for overdose and feels she may have had a seizure secondary to the cerebral hemorrhage.      Critical Care Note:  The patient arrived with a condition with significant morbidity and mortality associated. The services I provided  were to promote improvement and reduce mortality specifically involving complex record review, complex medical decisions and interventions, and consultations outside the regular procedures and care normally rendered for 65 minutes of critical care time      Admission disposition: 5/31/2025 12:29 PM       This note was prepared using Dragon Medical voice recognition dictation software.  As a result errors may occur.  When identified to these areas have been corrected.   While every attempt is made to correct errors during dictation discrepancies may still exist.  Please contact if there are any errors.    Medical Decision Making      Disposition and Plan     Clinical Impression:  1. Cerebral hemorrhage (HCC)    2. Seizure (HCC)    3. Altered mental status, unspecified altered mental status type         Disposition:  Admit  5/31/2025 12:29 pm    Follow-up:  No follow-up provider specified.        Medications Prescribed:  There are no discharge medications for this patient.            Supplementary Documentation:         Hospital Problems       Present on Admission  Date Reviewed: 11/3/2021          ICD-10-CM Noted POA    * (Principal) Seizure-like activity (HCC) R56.9 5/31/2025 Yes

## 2025-05-31 NOTE — PROGRESS NOTES
05/31/25 1000   Vent Information   $ RT Standby Charge (per 15 min) 1   $ Vent Care / Non-Invasive Initial Day Yes   $ Ventilator supplies provided Yes   Is this patient on Chronic Ventilation? No   Vent Initiation Date 05/31/25   Vent Initiation Time 0950   Ventilation Day(s) 1   Interface Invasive   Vent Type    Vent plugged into main power? Yes   Vent -8   Vent Mode VC+   Settings   FiO2 (%) 100 %   Resp Rate (Set) 16   Vt (Set, mL) 500 mL   Waveform Decelerating ramp   PEEP/CPAP (cm H2O) 5 cm H20   Insp Time (sec) 0.9 sec   Insp Rise Time (%) 3 %   Humidification Heater   H2O Bag Level Filled   Heater Temperature 72 °F (22.2 °C)   Readings   Total RR 16   Minute Ventilation (L/min) 8 L/min   Inspiratory Tidal Volume 500 mL   Expiratory Tidal Volume 511 mL   PIP Observed (cm H2O) 26 cm H2O   MAP (cm H2O) 10   I/E Ratio 1:3.2   Plateau Pressure (cm H2O) 21 cm H2O   Static Compliance (L/cm H2O) 32   Alarms   High RR 40   Insp Pressure High (cm H2O) 40 cm H2O   MV High (L/min) 20 L/min   MV Low (L/min) 2 L/min   Apnea Interval (sec) 20 seconds   Apnea Rate 16   Apnea Volume (mL) 500 mL     Pt arrived via EMS intubated with a 7.0 @ 24 Lip. Pt bilaterally coarse with a moderate amount of thick bloody sputum. Pt intubated for airway protection and placed on above settings ABG drawn and resulted.    1016 Pt to CT

## 2025-05-31 NOTE — ED QUICK NOTES
Dr. Vega, ICU MD is at bedside. Pt starting to fight ventilator by moving arms and sticking tongue out and making eye movements. Soft wrist restraints ordered and placed on patient. Dr. Vega gave verbal order to increase propofol amount, give 50 mcg of fentanyl and fentanyl drip. Family aware of plan of care.

## 2025-05-31 NOTE — ED INITIAL ASSESSMENT (HPI)
Pt presents to ED via ems for possible overdose. Pt was found unresponsive, downtime was about 15 mins per ems, cpr was started on scene. When ems arrives, strong pulse was found. Pin point pupils, 4 mg narcan given. Pt was intubated en route. Pt was given 200 mg ketamine, 2mg versed en route. Known opoid addict.  Pt has been clean for about 10 years. Dad recently passed away a month ago.

## 2025-05-31 NOTE — CONSULTS
Was called to see the patient in the ER during her workup    In brief 29-year-old female multiple medications for depression previous polysubstance abuse who reportedly was with her boyfriend helping their brother move into a new place.  Patient was at her usual state of health other than some recent stressors from the death 1 week ago.  Patient reportedly was parked in her car her  saw her and for about 15 minutes she was in the car without any witnesses.  During that time it appears that she was unresponsive without any bowel or bladder incontinence but there may have been some tonic-clonic activity not verified.  Patient will intubated in the field and then brought to the ER.  Lactate was initially 17 hemodynamically stable on sedation.  I reviewed the CAT scans and felt that there was signs of a neurologic emergency therefore I reiterated this to the emergency physician and we spoke about further management.  In the meantime I was at the bedside talking to family along with the nurses.  The patient was moving all extremities although not necessarily purposefully.  Sedation was increased to propofol at 70 fentanyl 50 mcg push and then drip ordered.  Norepinephrine as needed    Review of systems unable  Physical exam female coughing on ventilator but no following commands although deeply sedated    Assessment and plan 29-year-old female with respiratory failure lactic acidosis    1.  Unresponsiveness likely secondary to seizure initial lactate very elevated now severely downtrending therefore seizure seems likely  CT head shows signs of mass versus hemorrhage  Suggested Keppra loading  Patient will be dispositioned to the  ICU for the expertise of the neurocritical care and neurosurgeon    2.  Pulmonary respiratory failure intubated for airway protection    3.  Cardiovascular no signs of shock presumptive systolic blood pressure goals less than 140    4.  GI no lianna abdominal pathology based on  exam    5.  Renal no signs of kidney injury    6.  Infectious disease no fever with severe leukocytosis presumably secondary to seizure should mitigate  Low threshold for antibiotics    7.  Hematology mild polycythemia likely clinically dry no significant thrombocytopenia would hold off on DVT prophylaxis    8.  Endocrine no history of endocrinopathy    Disposition CN ICU      Upon my evaluation, this patient had a high probability of imminent or life-threatening deterioration due to respiratory failure, which required my direct attention, intervention, and personal management.    I have personally provided 35 minutes of critical care time, exclusive of time spent on separately billable procedures.  Time includes review of all pertinent laboratory/radiology results, discussion with consultants, and monitoring for potential decompensation.  Performed interventions included managing mechanical ventilation    .

## 2025-06-01 LAB
ANION GAP SERPL CALC-SCNC: 11 MMOL/L (ref 0–18)
ARTERIAL PATENCY WRIST A: POSITIVE
BASE EXCESS BLDA CALC-SCNC: -2.7 MMOL/L (ref ?–2)
BASOPHILS # BLD AUTO: 0.05 X10(3) UL (ref 0–0.2)
BASOPHILS NFR BLD AUTO: 0.3 %
BODY TEMPERATURE: 98.6 F
BUN BLD-MCNC: 16 MG/DL (ref 9–23)
CA-I BLD-SCNC: 1.16 MMOL/L (ref 0.95–1.32)
CALCIUM BLD-MCNC: 8.6 MG/DL (ref 8.7–10.6)
CHLORIDE SERPL-SCNC: 113 MMOL/L (ref 98–112)
CO2 SERPL-SCNC: 19 MMOL/L (ref 21–32)
COHGB MFR BLD: 0.8 % SAT (ref 0–3)
CREAT BLD-MCNC: 1.41 MG/DL (ref 0.55–1.02)
EGFRCR SERPLBLD CKD-EPI 2021: 51 ML/MIN/1.73M2 (ref 60–?)
EOSINOPHIL # BLD AUTO: 0.29 X10(3) UL (ref 0–0.7)
EOSINOPHIL NFR BLD AUTO: 1.7 %
ERYTHROCYTE [DISTWIDTH] IN BLOOD BY AUTOMATED COUNT: 12.9 %
FIO2: 40 %
GLUCOSE BLD-MCNC: 101 MG/DL (ref 70–99)
GLUCOSE BLD-MCNC: 127 MG/DL (ref 70–99)
GLUCOSE BLD-MCNC: 140 MG/DL (ref 70–99)
HCO3 BLDA-SCNC: 22.9 MEQ/L (ref 21–27)
HCT VFR BLD AUTO: 41.3 % (ref 35–48)
HGB BLD-MCNC: 13.9 G/DL (ref 12–16)
HGB BLD-MCNC: 14.2 G/DL (ref 12–16)
IMM GRANULOCYTES # BLD AUTO: 0.08 X10(3) UL (ref 0–1)
IMM GRANULOCYTES NFR BLD: 0.5 %
LACTATE BLD-SCNC: 0.9 MMOL/L (ref 0.5–2)
LYMPHOCYTES # BLD AUTO: 2.33 X10(3) UL (ref 1–4)
LYMPHOCYTES NFR BLD AUTO: 13.9 %
MCH RBC QN AUTO: 31.3 PG (ref 26–34)
MCHC RBC AUTO-ENTMCNC: 34.4 G/DL (ref 31–37)
MCV RBC AUTO: 91.2 FL (ref 80–100)
METHGB MFR BLD: 0.6 % SAT (ref 0.4–1.5)
MONOCYTES # BLD AUTO: 1 X10(3) UL (ref 0.1–1)
MONOCYTES NFR BLD AUTO: 5.9 %
MRSA DNA SPEC QL NAA+PROBE: NEGATIVE
NEUTROPHILS # BLD AUTO: 13.07 X10 (3) UL (ref 1.5–7.7)
NEUTROPHILS # BLD AUTO: 13.07 X10(3) UL (ref 1.5–7.7)
NEUTROPHILS NFR BLD AUTO: 77.7 %
OSMOLALITY SERPL CALC.SUM OF ELEC: 299 MOSM/KG (ref 275–295)
OXYHGB MFR BLDA: 98.6 % (ref 92–100)
PCO2 BLDA: 31 MM HG (ref 35–45)
PEEP: 5 CM H2O
PH BLDA: 7.43 [PH] (ref 7.35–7.45)
PLATELET # BLD AUTO: 241 10(3)UL (ref 150–450)
PO2 BLDA: 360 MM HG (ref 80–100)
POTASSIUM BLD-SCNC: 3.8 MMOL/L (ref 3.6–5.1)
POTASSIUM SERPL-SCNC: 4 MMOL/L (ref 3.5–5.1)
PRESSURE SUPPORT: 5 CM H2O
RBC # BLD AUTO: 4.53 X10(6)UL (ref 3.8–5.3)
SODIUM BLD-SCNC: 139 MMOL/L (ref 135–145)
SODIUM SERPL-SCNC: 143 MMOL/L (ref 136–145)
WBC # BLD AUTO: 16.8 X10(3) UL (ref 4–11)

## 2025-06-01 PROCEDURE — 99291 CRITICAL CARE FIRST HOUR: CPT | Performed by: INTERNAL MEDICINE

## 2025-06-01 PROCEDURE — 99222 1ST HOSP IP/OBS MODERATE 55: CPT | Performed by: NEUROLOGICAL SURGERY

## 2025-06-01 PROCEDURE — 99292 CRITICAL CARE ADDL 30 MIN: CPT | Performed by: INTERNAL MEDICINE

## 2025-06-01 RX ORDER — ACETAMINOPHEN 325 MG/1
650 TABLET ORAL EVERY 4 HOURS PRN
Status: DISCONTINUED | OUTPATIENT
Start: 2025-06-01 | End: 2025-06-03

## 2025-06-01 RX ORDER — SODIUM CHLORIDE 9 MG/ML
INJECTION, SOLUTION INTRAVENOUS CONTINUOUS
Status: DISCONTINUED | OUTPATIENT
Start: 2025-06-01 | End: 2025-06-02

## 2025-06-01 RX ORDER — LABETALOL HYDROCHLORIDE 5 MG/ML
10 INJECTION, SOLUTION INTRAVENOUS EVERY 10 MIN PRN
Status: DISCONTINUED | OUTPATIENT
Start: 2025-06-01 | End: 2025-06-03

## 2025-06-01 RX ORDER — ONDANSETRON 2 MG/ML
4 INJECTION INTRAMUSCULAR; INTRAVENOUS EVERY 6 HOURS PRN
Status: DISCONTINUED | OUTPATIENT
Start: 2025-06-01 | End: 2025-06-03

## 2025-06-01 RX ORDER — HYDRALAZINE HYDROCHLORIDE 20 MG/ML
10 INJECTION INTRAMUSCULAR; INTRAVENOUS EVERY 2 HOUR PRN
Status: DISCONTINUED | OUTPATIENT
Start: 2025-06-01 | End: 2025-06-03

## 2025-06-01 RX ORDER — ACETAMINOPHEN 650 MG/1
650 SUPPOSITORY RECTAL EVERY 4 HOURS PRN
Status: DISCONTINUED | OUTPATIENT
Start: 2025-06-01 | End: 2025-06-03

## 2025-06-01 RX ORDER — METOCLOPRAMIDE HYDROCHLORIDE 5 MG/ML
5 INJECTION INTRAMUSCULAR; INTRAVENOUS EVERY 8 HOURS PRN
Status: DISCONTINUED | OUTPATIENT
Start: 2025-06-01 | End: 2025-06-03

## 2025-06-01 RX ORDER — LEVOFLOXACIN 5 MG/ML
750 INJECTION, SOLUTION INTRAVENOUS EVERY 24 HOURS
Status: COMPLETED | OUTPATIENT
Start: 2025-06-01 | End: 2025-06-03

## 2025-06-01 NOTE — PROGRESS NOTES
[facility]  Neurocritical Care Progress Note     Claire Goff Patient Status:  Inpatient    1995 MRN [id number]   Location [facility] 6NE-A Attending Caitlin Foss MD   Hosp Day # 1 PCP Siobhan Sierra MD     Subjective:     No acute events overnight.    Vitals:   Temp:  [97.5 °F (36.4 °C)-100.8 °F (38.2 °C)] 97.5 °F (36.4 °C)  Pulse:  [] 57  Resp:  [14-22] 16  BP: ()/() 101/75  SpO2:  [94 %-100 %] 97 %  FiO2 (%):  [40 %-100 %] 40 %  Body mass index is 29.76 kg/m².    Intake/Output:    Intake/Output Summary (Last 24 hours) at 2025 0959  Last data filed at 2025 0821  Gross per 24 hour   Intake 2012.76 ml   Output 2150 ml   Net -137.24 ml     Current Meds:  Current Facility-Administered Medications   Medication Dose Route Frequency    levoFLOXacin in dextrose 5% (Levaquin) 750 mg/150mL IVPB premix 750 mg  750 mg Intravenous Q24H    norepinephrine (Levophed) 4 mg/250mL infusion premix  0.5-50 mcg/min Intravenous Continuous    sodium chloride 0.9% infusion   Intravenous Continuous    levETIRAcetam (Keppra) 500 mg/5mL injection 1,000 mg  1,000 mg Intravenous Q12H    morphINE PF 2 MG/ML injection 1 mg  1 mg Intravenous Q2H PRN    Or    morphINE PF 2 MG/ML injection 2 mg  2 mg Intravenous Q2H PRN    Or    morphINE PF 4 MG/ML injection 4 mg  4 mg Intravenous Q2H PRN    acetaminophen (Tylenol Extra Strength) tab 500 mg  500 mg Oral Q4H PRN    acetaminophen (Tylenol) tab 650 mg  650 mg Oral Q4H PRN    Or    HYDROcodone-acetaminophen (Norco) 5-325 MG per tab 1 tablet  1 tablet Oral Q4H PRN    Or    HYDROcodone-acetaminophen (Norco) 5-325 MG per tab 2 tablet  2 tablet Oral Q4H PRN    ondansetron (Zofran) 4 MG/2ML injection 4 mg  4 mg Intravenous Q6H PRN    prochlorperazine (Compazine) 10 MG/2ML injection 5 mg  5 mg Intravenous Q8H PRN    polyethylene glycol (PEG 3350) (Miralax) 17 g oral packet 17 g  17 g Oral Daily PRN    sennosides (Senokot) tab 17.2 mg  17.2  mg Oral Nightly PRN    bisacodyl (Dulcolax) 10 MG rectal suppository 10 mg  10 mg Rectal Daily PRN    fleet enema (Fleet) rectal enema 133 mL  1 enema Rectal Once PRN    propofol (Diprivan) 10 mg/mL infusion premix  5-50 mcg/kg/min Intravenous Continuous    dexmedeTOMIDine in sodium chloride 0.9% (Precedex) 400 mcg/100mL infusion premix  0.2-1.5 mcg/kg/hr (Dosing Weight) Intravenous Continuous     Physical Examination:   General- intubated and sedated  CV- RRR  Resp- coarse mechanical breath sounds bilat  Neuro-  Mental status- Does not open eyes to voice or stim, does not regard or follow commands  Cranial nerves- pupils equally round and reactive to light, +corneal/cough  Motor/Sens- min w/d x4    Diagnostics:   MRI BRAIN MRA BRAIN+MRA NECK (ALL W+WO) (CPT=70553/57502/56990)  Result Date: 5/31/2025  CONCLUSION:  1. Although not definitive, the hyperdensity previously seen within the left occipital lobe could represent a 7 x 7 mm cavernous malformation with associated hemorrhage anteriorly measuring 11 x 8 mm.  No definitive enhancement is seen.  The anterior blood products demonstrate T1 shortening consistent with more subacute blood products.  A mild amount of vasogenic edema is seen within the left occipital lobe.  A follow-up contrast-enhanced brain MRI in 6 weeks is recommended to exclude an underlying neoplastic process or other etiologies for the left occipital lobe blood products. 2. The previously questioned acute subdural hemorrhage along the right tentorium is less conspicuous on this examination. 3. No acute infarct or hydrocephalus. 4. No high-grade stenosis, occlusion, aneurysm, or dissection is identified within the major intracranial or cervical arterial vasculature.    LOCATION:  [facility]   Dictated by (CST): Stromberg, LeRoy, MD on 5/31/2025 at 3:30 PM     Finalized by (CST): Stromberg, LeRoy, MD on 5/31/2025 at 3:49 PM       CT BRAIN OR HEAD (CPT=70450)  Result Date: 5/31/2025  CONCLUSION:   1.  There is a hyperdensity at the left occipital lobe measuring 11 x 10 mm.  This may represent acute blood products or a hemorrhagic lesion.  A contrast-enhanced brain MRI with and without intravenous contrast is recommended for further assessment.   2. Asymmetric hyperdensity at the right tentorial leaflet may represent a subdural hemorrhage.  This can also be further assessed on the subsequent brain MRI.   Critical results were discussed with Dr. Lawrence at 1120 hours on 5/31/2025. Critical results were read back.     LOCATION:  [facility]   Dictated by (CST): Stromberg, LeRoy, MD on 5/31/2025 at 11:14 AM     Finalized by (CST): Stromberg, LeRoy, MD on 5/31/2025 at 11:21 AM       XR CHEST AP PORTABLE  (CPT=71045)  Result Date: 5/31/2025  CONCLUSION:  Heart size within normal limits.  Symmetric perihilar interstitial opacities may reflect pulmonary edema or inflammatory process.  No discrete airspace consolidation.  The pleural spaces are clear.  Endotracheal tube tip terminates approximately 4.5 cm from the tawanna.  Enteric catheter terminates in the expected location of the gastric body.   LOCATION:  [facility]      Dictated by (CST): Hardik Dunlap MD on 5/31/2025 at 10:22 AM     Finalized by (CST): Hardik Dunlap MD on 5/31/2025 at 10:23 AM       Lab Review     Recent Labs   Lab 05/31/25  0954 06/01/25  0423    143   K 4.0 4.0    113*   CO2 <10.0* 19.0*   * 127*   BUN 13 16   CREATSERUM 1.36* 1.41*     Recent Labs   Lab 05/31/25  0953 06/01/25  0423   WBC 19.3* 16.8*   HGB 16.2* 14.2   .0 241.0     Assesment/Plan:   29 year old female with a h/o asthma, anxiety do, depression, and polysub abuse p/w seizure-like activity 5/31. Pt was found unresponsive by family with generalized tonic-clonic activity, intubated for airway protection per EMS and brought to ED where w/u revealed ct head with 11mm L occ hyperdensity and possible R tentorial sdh, thus pt loaded with keppra     Neuro:  New onset seizure-like  activity- pt reportedly found unresponsive with generalized tonic-clonic activity.   Pt with risk factors of stressors, h/o polysub abuse (though denies any current use), and new L occ hyperdensity on CT.   vEEG overnight with slowing and L frontal sharp waves, no seizures. Will wean sedation as tolerated and cont keppra for seizure prophy and cont vEEG.   Cont neurochecks/pt/ot/st.  L occ hyperdensity and possible R tentorial SDH- Mri brain w/wo c/f L occ subacute hemorrhage with possible underlying cavernoma. No acute intervention per Neurosurg.   F/u imaging as outpt.  ICH score = 1 for GCS  Depression/anxiety- per IM  Cardiac:  sbp goal 100-150  Pulmonary:  Resp failure- 2/2 above, vent management per pulm. Wean sedation as rodolfo and if neuro appropriate may consider extub.  Renal:  MERCEDEZ- IVFs, monitor BUN/Cr  GI:  npo  Heme/ID:  Afebrile, trend leukocytosis. Empiric abx per Pulm.  Endocrine/Rheum:  Monitor glucose, sliding scale insulin prn  DVT Prophylaxis:  SCD’s  Upon my evaluation, this patient had a high probability of imminent or life-threatening deterioration due to central nervous system failure, seizure, and subdural hematoma, which required my direct attention, intervention, and personal management.     I have personally provided 80 minutes of critical care time, exclusive of time spent on separately billable procedures.  Time includes review of all pertinent laboratory/radiology results, discussion with consultants, and monitoring for potential decompensation.  Performed interventions included vEEG, AED.      Thank you for allowing me to participate in the care of this patient.      Ahsan Olivia MD, [facility]  Medical Director of System Neurosciences  Chief, Section of Neurocritical Care  [facility]

## 2025-06-01 NOTE — PLAN OF CARE
Assumed care at 1930.  Pt is intubated and sedated on Propofol and Precedex.   Neuro check Q1 with pupillometer. Pt moves all four extremities non purposefully.   Precedex started weaning since 2100.   0300 Pt was not responding to painful stimuli nor sternum rub. Sedation paused. Notified neuro APN.   APN at bedside and neuro assessment performed. Pt was unresponsive. STAT CT ordered.   0330 Pt started waking up, moving all four extremities and follow simple commands. CT cancelled.  NG tube to SWETA Liriano in place.  Plan of care discussed with spouse at bedside.

## 2025-06-01 NOTE — CONSULTS
Pulmonary H&P/Consult       NAME: Claire Goff - ROOM: [address] [place] [address]-A - MRN: [id number] - Age: 29 year old - :  1995    Date of Admission: 2025  9:45 AM  Admission Diagnosis: Cerebral hemorrhage (HCC) [I61.9]  Seizure (HCC) [R56.9]  Altered mental status, unspecified altered mental status type [R41.82]    Reason for consult: resp failure       History of Present Illness: 28 yo F without significant pmh who presented after being found down by her . They were helping her brother move yesterday am, she went to get coffee.  As her  drove back he saw her car. Approached and saw her slumped over the wheel. Blood in mouth. Ems called, pt intubated.  Concern for seizure activity    Past Medical History[1]   Past Surgical History[2]     Allergies:  Allergies[3]    Social History:  Social History     Socioeconomic History    Marital status:      Spouse name: Not on file    Number of children: Not on file    Years of education: Not on file    Highest education level: Not on file   Occupational History    Not on file   Tobacco Use    Smoking status: Former     Current packs/day: 0.00     Average packs/day: 0.5 packs/day for 4.0 years (2.0 ttl pk-yrs)     Types: Cigarettes     Start date: 2013     Quit date: 2017     Years since quittin.1    Smokeless tobacco: Never   Vaping Use    Vaping status: Never Used   Substance and Sexual Activity    Alcohol use: No    Drug use: Yes     Types: Cannabis     Comment: mj at night only    Sexual activity: Not on file   Other Topics Concern    Not on file   Social History Narrative    Not on file     Social Drivers of Health     Food Insecurity: Patient Unable To Answer (2025)    NCSS - Food Insecurity     Worried About Running Out of Food in the Last Year: Patient unable to answer     Ran Out of Food in the Last Year: Patient unable to answer   Transportation Needs: Patient Unable To Answer (2025)    NCSS - Transportation     Lack of  Transportation: Patient unable to answer   Housing Stability: Patient Unable To Answer (5/31/2025)    NCSS - Housing/Utilities     Has Housing: Patient unable to answer     Worried About Losing Housing: Patient unable to answer     Unable to Get Utilities: Patient unable to answer        Family History:  Family History[4]     Home Medications:  Medications Taking[5]    Scheduled Medication:   levETIRAcetam  1,000 mg Intravenous Q12H     Continuous Infusing Medication:   norepinephrine      sodium chloride 75 mL/hr at 06/01/25 0512    propofol 40 mcg/kg/min (06/01/25 0608)    dexmedetomidine Stopped (06/01/25 0600)     PRN Medication:PRN Medications[6]     REVIEW OF SYSTEMS:   Unable to obtain from patient     OBJECTIVE:  Vitals:    06/01/25 0300 06/01/25 0400 06/01/25 0500 06/01/25 0600   BP: 102/76 103/78 108/80 106/78   BP Location: Left arm Left arm Left arm Left arm   Pulse: 61 58 58 58   Resp: 16 16 16 16   Temp: 99.1 °F (37.3 °C) 99 °F (37.2 °C) 98.6 °F (37 °C) 98.4 °F (36.9 °C)   TempSrc: Bladder Bladder     SpO2: 99% 99% 100% 99%   Weight: 190 lb 0.6 oz (86.2 kg)      Height:         Tmax: 100.8                Ventilator Settings: AC 16// FIO2 40/PEEP 5   Ppk 23   Ppl 20  FiO2 (%): 40 %           Wt Readings from Last 3 Encounters:   06/01/25 190 lb 0.6 oz (86.2 kg)   11/03/21 159 lb (72.1 kg)   09/14/19 146 lb (66.2 kg)         Intake/Output Summary (Last 24 hours) at 6/1/2025 0658  Last data filed at 6/1/2025 0600  Gross per 24 hour   Intake 2012.76 ml   Output 2090 ml   Net -77.24 ml       /72 (BP Location: Left arm)   Pulse 57   Temp 98.2 °F (36.8 °C)   Resp 16   Ht 5' 7\" (1.702 m)   Wt 190 lb 0.6 oz (86.2 kg)   SpO2 99%   BMI 29.76 kg/m²     General Appearance:    Intubated, sedated   Head:    Normocephalic, without obvious abnormality, atraumatic   Eyes:    PERRL, conjunctiva/corneas clear   Neck:   Supple, symmetrical, trachea midline, no adenopathy;        thyroid:  No  enlargement/tenderness/nodules; no carotid    bruit or JVD   Back:     Symmetric, no curvature, ROM normal, no CVA tenderness   Lungs:     Clear to auscultation bilaterally, respirations unlabored   Chest wall:    No tenderness or deformity   Heart:    Regular rate and rhythm, S1 and S2 normal, no murmur, rub   or gallop   Abdomen:     Soft, non-tender, bowel sounds active all four quadrants,     no masses, no organomegaly   Extremities:   Extremities normal, atraumatic, no cyanosis or edema   Pulses:   2+ and symmetric all extremities   Skin:   Skin color, texture, turgor normal, no rashes or lesions       Recent Labs   Lab 05/31/25  0953 06/01/25  0423   WBC 19.3* 16.8*   HGB 16.2* 14.2   HCT 53.8* 41.3   .0 241.0     No results for input(s): \"INR\" in the last 168 hours.    Recent Labs   Lab 05/31/25  0954 06/01/25  0423    143   K 4.0 4.0    113*   CO2 <10.0* 19.0*   BUN 13 16   CREATSERUM 1.36* 1.41*   * 127*   CA 10.3 8.6*   AST 76*  --    ALT 74*  --    ALKPHO 60  --    BILT 0.3  --    ALB 5.4*  --    TP 8.3*  --          Creatinine (mg/dL)   Date Value   06/01/2025 1.41 (H)   05/31/2025 1.36 (H)   09/14/2019 0.59     CREATININE (P) (mg/dL)   Date Value   05/02/2015 0.66   ]        Imaging: cxr: bilat perihilar infiltrates     ASSESSMENT/PLAN:    Acute hypoxic resp failure: secondary to neurological event and perhaps aspiration pneumonia vs pneumonitis   - leave on mechanical ventilation until neuro status improves   - send sputum cx and empiric levaquin for now given pcn allergy   - f/u cx and adjust as able   Seizure, abnl ct head: concern for occipital bleed and possible cavernous malformation   - per neuro teams   - on keppra   Fen: npo   Prophy: scd   Dispo: full code. ICU. D/w  at length     Critical Care Time greater than: 30 Minutes            Rhonda Baumann M.D.  [facility]  Pulmonary  6/1/2025         [1]   Past Medical History:   Anxiety state, unspecified     Asthma (HCC)    Asthma, mild intermittent (HCC)    Depression    Medication    Extrinsic asthma, unspecified    Positive PPD, treated    rifampin 6 months at HD   [2]   Past Surgical History:  Procedure Laterality Date    Other surgical history Right     4 surgeries to eye   [3]   Allergies  Allergen Reactions    Penicillins     Sulfa Antibiotics    [4]   Family History  Problem Relation Age of Onset    Cancer Mother         breast ca before 48- not sure age.    Other (Other) Mother          of etoh cirrhosis at 48; brain aneurysm or trauma to brain after a fall- unclear    Psychiatric Brother         anxiety depression    Diabetes Father         type 2 insulin req    Heart Disorder Father         chf in 50s/cad    Hypertension Father     Lipids Father     Psychiatric Father         depression. bipolar. anxiety    Other (Other) Father         asthma/copd/severe spine arthritis and knees.    Cancer Paternal Grandmother         breast    Cancer Other         m aunt breast ca   [5]   Outpatient Medications Marked as Taking for the 25 encounter (Hospital Encounter)   Medication Sig Dispense Refill    busPIRone 15 MG Oral Tab Take 1 tablet (15 mg total) by mouth 4 (four) times daily.      DULoxetine 30 MG Oral Cap DR Particles Take by mouth daily.     [6]   morphINE **OR** morphINE **OR** morphINE    acetaminophen    acetaminophen **OR** HYDROcodone-acetaminophen **OR** HYDROcodone-acetaminophen    ondansetron    prochlorperazine    polyethylene glycol (PEG 3350)    sennosides    bisacodyl    fleet enema

## 2025-06-01 NOTE — PROGRESS NOTES
05/31/25 2118   Vent Information   $ RT Standby Charge (per 15 min) 1   Vent Initiation Date 05/31/25   Vent Initiation Time 0950   Ventilation Day(s) 1   Interface Invasive   Vent Type    Vent plugged into main power? Yes   Vent -8   Vent Mode VC+   Settings   FiO2 (%) 40 %   Resp Rate (Set) 16   Vt (Set, mL) 500 mL   Waveform Decelerating ramp   PEEP/CPAP (cm H2O) 5 cm H20   Insp Time (sec) 0.9 sec   Insp Rise Time (%) 3 %   Humidification Heater   H2O Bag Level 3/4 Full   Heater Temperature 98.6 °F (37 °C)   Readings   ETCO2 (mmHg) 26 mmHg   Total RR 16   Minute Ventilation (L/min) 7.7 L/min   Inspiratory Tidal Volume 500 mL   Expiratory Tidal Volume 499 mL   PIP Observed (cm H2O) 26 cm H2O   MAP (cm H2O) 9.8   I/E Ratio 1:3.2   Plateau Pressure (cm H2O) 22 cm H2O   Static Compliance (L/cm H2O) 30   Alarms   High RR 40   Insp Pressure High (cm H2O) 40 cm H2O   Insp Pressure Low (cm H2O) 9 cm H2O   MV High (L/min) 20 L/min   MV Low (L/min) 2 L/min   Apnea Interval (sec) 20 seconds   Apnea Rate 16   Apnea Volume (mL) 500 mL   ETT   Placement Date/Time: 05/31/25 0940   Airway Size: 7 mm  Cuffed: Cuffed  Insertion attempts: 1  Technique: Direct laryngoscopy;Stylet  Placed By: (c) Other (Comment)   Secured at (cm) 23 cm   Cuff Pressure (cm H2O) 26 cm H2O   Suctioned? Y   Measured From Lips   Secured Location Center   Secured by Commercial tube stafford   Site Condition Dry   Req'd equipment at bedside Bag mask     Received patient on ventilator with above settings. No changes have been made during the shift. Patient remained critical to wean.

## 2025-06-01 NOTE — RESPIRATORY THERAPY NOTE
Patient placed on SBT PS 5/+5/40% for 40 minutes. NIF -47, VC 2018, RSBI 39. ABG done. Patient extubated to 3L nasal cannula per Dr. Baumann. Patient tolerated well. Will continue to monitor.

## 2025-06-01 NOTE — RESPIRATORY THERAPY NOTE
Current Mechanical Ventilator Settings:  - Mode: Acvc+  - Rate: 16  - Tidal Volume(mL):500  - FiO2: 40%  - PEEP +5  - inspiratory time 0.9    Breath Sounds: dim    ETT Size: 7.0    Measured Parameters:  Peak Inspiratory Pressure(cmH2O): 23  Plateu Pressure(cmH2O) : 21  Tidal Volume(mL): 493  Rate:16    Shift Events noted: Received pt on full support.  Sx'd moderate amt of thick tan secretions from  ET . Tube. No changes made overnight. Routine care given.

## 2025-06-01 NOTE — PROGRESS NOTES
[facility]   part of [facility] Hospitalist Progress Note     Claire Goff Patient Status:  Inpatient    1995 MRN [id number]   Location [facility] 6NE-A Attending Caitlin Foss MD   Hosp Day # 1 PCP Siobhan Sierra MD     Subjective:     Patient seen and examined.   Awake, sedated  Following commands  Crying  Able to write on paper  Family at bedside    Objective:    Review of Systems:   10 point ROS completed and was negative, except for pertinent positive and negatives stated in subjective.    Vital signs:  Temp:  [97.4 °F (36.3 °C)-100.8 °F (38.2 °C)] 98.2 °F (36.8 °C)  Pulse:  [] 57  Resp:  [14-23] 16  BP: (101-165)/() 101/72  SpO2:  [94 %-100 %] 99 %  FiO2 (%):  [40 %-100 %] 40 %  General: intubated, sedated  HEENT: Normocephalic atraumatic. Moist mucous membranes. EOM-I. PERRLA. Anicteric.  Neck: No lymphadenopathy. No JVD. No carotid bruits.  Respiratory: Clear to auscultation bilaterally. No wheezes. No rhonchi.  Cardiovascular: S1, S2. Regular rate and rhythm. No murmurs, rubs or gallops. Equal pulses.   Chest and Back: No tenderness or deformity.  Abdomen: Soft, nontender, nondistended.  Positive bowel sounds. No rebound, guarding or organomegaly.        Diagnostic Data:    Labs:  Recent Labs   Lab 25  0953 25  0423   WBC 19.3* 16.8*   HGB 16.2* 14.2   .5* 91.2   .0 241.0   BAND 5  --        Recent Labs   Lab 25  0954 25  0423   * 127*   BUN 13 16   CREATSERUM 1.36* 1.41*   CA 10.3 8.6*   ALB 5.4*  --     143   K 4.0 4.0    113*   CO2 <10.0* 19.0*   ALKPHO 60  --    AST 76*  --    ALT 74*  --    BILT 0.3  --    TP 8.3*  --        Estimated Creatinine Clearance: 56.7 mL/min (A) (based on SCr of 1.41 mg/dL (H)).    No results for input(s): \"PTP\", \"INR\" in the last 168 hours.         COVID-19 Lab Results    COVID-19  No results found for: \"COVID19\"    Pro-Calcitonin  No results for  input(s): \"PCT\" in the last 168 hours.    Cardiac  No results for input(s): \"TROP\", \"PBNP\" in the last 168 hours.    Creatinine Kinase  No results for input(s): \"CK\" in the last 168 hours.    Inflammatory Markers  No results for input(s): \"CRP\", \"AREN\", \"LDH\", \"DDIMER\" in the last 168 hours.    Imaging: Imaging data reviewed in Epic.    Medications: Scheduled Medications[1]    Assessment & Plan:    Claire Goff is a 29 year old female with PMH significant for anxiety, asthma, depression, presents with her family after being found unresponsive, with possible cardiac arrest.       Unresponsive in field, possible seizure  Acute hypoxic resp failure, unable to protect airway -intubated, sedated  -CPR was done in field, unclear how long, patient intubated in field  -Wean sedation, wean ventilator if possible  -extubate per pulm  -Supportive care  -pulm consulted for vent management     New seizure-like activity  -Found unresponsive with generalized tonic-clonic activity  -Loaded with Keppra in ED, continue for now  -Check EEG  -Frequent neurochecks  -PT/OT/ST  -Neurocritical care consulted-appreciate recommendations     New left and right hypodensities-concern for hemorrhage/SDH  -CT reviewed  -MRI/MRA reviewed  -Neurosurgery consulted-appreciate recommendations  -Neurocritical care following >> apprec recs  -cont keppra     Resp / metabolic acidosis  Lactic acidosis  -ABD shows pH 6.8 on admission to ED >> with vent mx improved to 7.30  -LA >16 >> 4.1  -monitor O2 and Cos  -renal function appears healthy  -monitor urine output    Leukocytosis  -wbc 19 >>16.8  -send sputum cx  -empiric abx          Quality:  DVT Prophylaxis: scds  CODE status: FULL CODE  Liriano: no  If COVID testing is negative, may discontinue isolation: yes      Plan of care discussed with patients family, all questions answered.           Caitlin Foss MD  [facility]  Pager [phone]  Answering Service number: [phone]                     [1]    levofloxacin  750 mg Intravenous Q24H    levETIRAcetam  1,000 mg Intravenous Q12H

## 2025-06-01 NOTE — SLP NOTE
ADULT SWALLOWING EVALUATION    ASSESSMENT    ASSESSMENT/OVERALL IMPRESSION:  Pt is a 28 y/o female admitted with unresponsiveness and possible cardiac arrest. PMHx anxiety, depression. Pt intubated 5/31-6/1. Order received for BSSE to r/o aspiration. Pt received awake and alert in bed with family and friends present at bedside. Pt currently resides at home with her . Pt endorsed mild anomia and dysarthria. Pt with hoarse vocal quality. Pt denied dysphagia symptoms including, but not limited to odynophagia, globus sensation, reflux, recent PNA, and unintentional weight loss. Pt consumes a regular diet and thin liquids at baseline.     Pt presented with an intact oropharyngeal swallow function. Bolus acceptance was intact w/o evidence of anterior bolus loss. Mastication and AP transit were thorough and efficient w/o evidence of oral residue. Hyolaryngeal excursion was present per palpation. No overt s/s of aspiration were observed.    Recommend pt initiate a regular diet and thin liquids. Aspiration precautions recommended (e.g. small bites and sips, slow rate, upright position) to prevent potential further pulmonary complications. Education was provided re: results and recommendations. SLP will follow up to reinforce aspiration precautions and complete communication evaluation.              RECOMMENDATIONS   Diet Recommendations - Solids: Regular  Diet Recommendations - Liquids: Thin Liquids                           Aspiration Precautions: Upright position, Slow rate, Small bites, Small sips  Medication Administration Recommendations: One pill at a time  Treatment Plan/Recommendations: Aspiration precautions, Communication evaluation    HISTORY   MEDICAL HISTORY  Reason for Referral: R/O aspiration    Problem List  Principal Problem:    Cerebral hemorrhage (HCC)  Active Problems:    Seizure-like activity (HCC)    Seizure (HCC)    Altered mental status, unspecified altered mental status type      Past Medical  History  Past Medical History[1]    Prior Living Situation: Home with spouse  Diet Prior to Admission: Regular, Thin liquids  Precautions: Aspiration, Seizure    Patient/Family Goals: n/a    SWALLOWING HISTORY  Current Diet Consistency: NPO  Dysphagia History: as above  Imaging Results:   CONCLUSION:    1. Although not definitive, the hyperdensity previously seen within the left occipital lobe could represent a 7 x 7 mm cavernous malformation with associated hemorrhage anteriorly measuring 11 x 8 mm.  No definitive enhancement is seen.  The anterior  blood products demonstrate T1 shortening consistent with more subacute blood products.  A mild amount of vasogenic edema is seen within the left occipital lobe.  A follow-up contrast-enhanced brain MRI in 6 weeks is recommended to exclude an underlying  neoplastic process or other etiologies for the left occipital lobe blood products.  2. The previously questioned acute subdural hemorrhage along the right tentorium is less conspicuous on this examination.  3. No acute infarct or hydrocephalus.  4. No high-grade stenosis, occlusion, aneurysm, or dissection is identified within the major intracranial or cervical arterial vasculature.          LOCATION:  [facility]        Dictated by (CST): Stromberg, LeRoy, MD on 5/31/2025 at 3:30 PM      Finalized by (CST): Stromberg, LeRoy, MD on 5/31/2025 at 3:49 PM      SUBJECTIVE       OBJECTIVE   ORAL MOTOR EXAMINATION  Dentition: Natural  Symmetry: Within Functional Limits  Strength: Within Functional Limits  Tone: Within Functional Limits  Range of Motion: Within Functional Limits  Rate of Motion: Within Functional Limits    Voice Quality: Hoarse  Respiratory Status: Nasal cannula  Consistencies Trialed: Thin liquids, Puree, Soft solid, Hard solid  Method of Presentation: Staff/Clinician assistance  Patient Positioned: Upright, Midline    Oral Phase of Swallow: Within Functional Limits                      Pharyngeal Phase of Swallow:  Within Functional Limits           (Please note: Silent aspiration cannot be evaluated clinically. Videofluoroscopic Swallow Study is required to rule-out silent aspiration.)    Esophageal Phase of Swallow: No complaints consistent with possible esophageal involvement  Comments: none              GOALS  Goal #1 The patient will tolerate regular consistency and thin liquids without overt signs or symptoms of aspiration with 90 % accuracy over 1-2 session(s).  In Progress   Goal #2 The patient/family/caregiver will demonstrate understanding and implementation of aspiration precautions and swallow strategies independently over 1-2 session(s).    In Progress   Goal #3 Pt will complete communication evaluation.   In Progress   FOLLOW UP  Treatment Plan/Recommendations: Aspiration precautions, Communication evaluation     Follow Up Needed (Documentation Required): Yes  SLP Follow-up Date: 06/03/25    Thank you for your referral.   If you have any questions, please contact DERIK Martell       [1]   Past Medical History:   Anxiety state, unspecified    Asthma (HCC)    Asthma, mild intermittent (HCC)    Depression    Medication    Extrinsic asthma, unspecified    Positive PPD, treated    rifampin 6 months at HD

## 2025-06-01 NOTE — PLAN OF CARE
Neuros every hour. Pt weaned off Precedex and Propofol.  Pt extubated at 1200.   Post extubation Pt follows commands. 5/5 with no drift on all extremities. Pt alert and oriented x4. Pt has some word finding issues.    Pt given medications as ordered. Tylenol given for Temp >100 at 1644.     Call light within reach and personal belongings at bedside.     Problem: Safety Risk - Non-Violent Restraints  Goal: Patient will remain free from self-harm  Description: INTERVENTIONS:  - Apply the least restrictive restraint to prevent harm  - Notify patient and family of reasons restraints applied  - Assess for any contributing factors to confusion (electrolyte disturbances, delirium, medications)  - Discontinue any unnecessary medical devices as soon as possible  - Assess the patient's physical comfort, circulation, skin condition, hydration, nutrition and elimination needs   - Reorient and redirection as needed  - Assess for the need to continue restraints  6/1/2025 1804 by Dianne Colunga RN  Outcome: Progressing  6/1/2025 1804 by Dianne Colunga RN  Outcome: Progressing     Problem: NEUROLOGICAL - ADULT  Goal: Achieves stable or improved neurological status  Description: INTERVENTIONS  - Assess for and report changes in neurological status  - Initiate measures to prevent increased intracranial pressure  - Maintain blood pressure and fluid volume within ordered parameters to optimize cerebral perfusion and minimize risk of hemorrhage  - Monitor temperature, glucose, and sodium. Initiate appropriate interventions as ordered  Outcome: Progressing  Goal: Absence of seizures  Description: INTERVENTIONS  - Monitor for seizure activity  - Administer anti-seizure medications as ordered  - Monitor neurological status  Outcome: Progressing  Goal: Achieves maximal functionality and self care  Description: INTERVENTIONS  - Monitor swallowing and airway patency with patient fatigue and changes in neurological status  -  Encourage and assist patient to increase activity and self care with guidance from PT/OT  - Encourage visually impaired, hearing impaired and aphasic patients to use assistive/communication devices  Outcome: Progressing

## 2025-06-01 NOTE — CONSULTS
[facility]  Neurosurgery Consult    Claire Goff Patient Status:  Inpatient    1995 MRN [id number]   Location [facility] 6NE-A Attending Caitlin Foss MD   Hosp Day # 1 PCP Siobhan Sierra MD     REASON FOR CONSULTATION:  Left occipital hemorrhage    HISTORY OF PRESENT ILLNESS:  Claire Goff is a(n) 29 year old female with a PMH of asthma, anxiety, depression, substance abuse who presented to the ED with seizure activity on 25. Patient and spouse were traveling to assist patient's brother with moving. Patient was in her own vehicle and spouse was in a moving truck. Patient was seen slumped in her vehicle and was alone approximately 15 minutes before she was discovered. Spouse had first thought she was crying because she had been grieving the death of her father who recently passed. Upon closer look, he could see she was slumped over and blood was coming from her mouth. When 911 was called, the spouse was advised to start CPR, however he states that she was breathing. EMS arrived and found she did have a pulse. She was intubated for airway precautions. Upon arrival to the ED, CT head revealed a hyperdensity in the left occipital lobe. MRI brain completed suggesting possible cavernous malformation. MRA negative for vascular cause.     This AM, patient remains intubated on sedation. EEG in place.     PAST MEDICAL HISTORY:  Past Medical History[1]    PAST SURGICAL HISTORY:  Past Surgical History[2]    FAMILY HISTORY:  family history includes Cancer in her mother, paternal grandmother, and another family member; Diabetes in her father; Heart Disorder in her father; Hypertension in her father; Lipids in her father; Other in her father and mother; Psychiatric in her brother and father.    SOCIAL HISTORY:   reports that she quit smoking about 8 years ago. She started smoking about 12 years ago. She has a 2 pack-year smoking history. She has never used smokeless tobacco. She reports current  drug use. Drug: Cannabis. She reports that she does not drink alcohol.    ALLERGIES:  Allergies[3]    MEDICATIONS:  Prescriptions Prior to Admission[4]  Current Hospital Medications[5]    REVIEW OF SYSTEMS:  A 10-point system was reviewed.  Pertinent positives and negatives are noted in HPI.      PHYSICAL EXAMINATION:  VITAL SIGNS: /75   Pulse 57   Temp 97.5 °F (36.4 °C)   Resp 16   Ht 67\"   Wt 190 lb 0.6 oz (86.2 kg)   SpO2 97%   BMI 29.76 kg/m²   GENERAL:  Patient is a 29 year old female in no acute distress. Intubated on vent  HEENT:  Normocephalic, atraumatic.  NEUROLOGICAL:  Gaze midline. Does not open eyes to sternal rub. Exa, is limited due to sedation.        DIAGNOSTIC DATA:   Lab Results   Component Value Date    WBC 16.8 06/01/2025    HGB 14.2 06/01/2025    HCT 41.3 06/01/2025    .0 06/01/2025    CREATSERUM 1.41 06/01/2025    BUN 16 06/01/2025     06/01/2025    K 4.0 06/01/2025     06/01/2025    CO2 19.0 06/01/2025     06/01/2025    CA 8.6 06/01/2025    PGLU 140 06/01/2025       IMAGING:  MRI BRAIN MRA BRAIN+MRA NECK (ALL W+WO) (CPT=70553/84945/31285)  Result Date: 5/31/2025  CONCLUSION:  1. Although not definitive, the hyperdensity previously seen within the left occipital lobe could represent a 7 x 7 mm cavernous malformation with associated hemorrhage anteriorly measuring 11 x 8 mm.  No definitive enhancement is seen.  The anterior blood products demonstrate T1 shortening consistent with more subacute blood products.  A mild amount of vasogenic edema is seen within the left occipital lobe.  A follow-up contrast-enhanced brain MRI in 6 weeks is recommended to exclude an underlying neoplastic process or other etiologies for the left occipital lobe blood products. 2. The previously questioned acute subdural hemorrhage along the right tentorium is less conspicuous on this examination. 3. No acute infarct or hydrocephalus. 4. No high-grade stenosis, occlusion,  aneurysm, or dissection is identified within the major intracranial or cervical arterial vasculature.    LOCATION:  [facility]   Dictated by (CST): Stromberg, LeRoy, MD on 5/31/2025 at 3:30 PM     Finalized by (CST): Stromberg, LeRoy, MD on 5/31/2025 at 3:49 PM       CT BRAIN OR HEAD (CPT=70450)  Result Date: 5/31/2025  CONCLUSION:   1. There is a hyperdensity at the left occipital lobe measuring 11 x 10 mm.  This may represent acute blood products or a hemorrhagic lesion.  A contrast-enhanced brain MRI with and without intravenous contrast is recommended for further assessment.   2. Asymmetric hyperdensity at the right tentorial leaflet may represent a subdural hemorrhage.  This can also be further assessed on the subsequent brain MRI.   Critical results were discussed with Dr. Lawrence at 1120 hours on 5/31/2025. Critical results were read back.     LOCATION:  [facility]   Dictated by (CST): Stromberg, LeRoy, MD on 5/31/2025 at 11:14 AM     Finalized by (CST): Stromberg, LeRoy, MD on 5/31/2025 at 11:21 AM       XR CHEST AP PORTABLE  (CPT=71045)  Result Date: 5/31/2025  CONCLUSION:  Heart size within normal limits.  Symmetric perihilar interstitial opacities may reflect pulmonary edema or inflammatory process.  No discrete airspace consolidation.  The pleural spaces are clear.  Endotracheal tube tip terminates approximately 4.5 cm from the tawanna.  Enteric catheter terminates in the expected location of the gastric body.   LOCATION:  [facility]      Dictated by (CST): Hardik Dunlap MD on 5/31/2025 at 10:22 AM     Finalized by (CST): Hardik Dunlap MD on 5/31/2025 at 10:23 AM          ASSESSMENT:  30 yo female who presented with seizure activity. Suspected cavernous malformation on MRI    Plan:  No plans for any surgical intervention  Continue medical management per NCC/Hospitalist  AEDs per Neuro  Will plan to follow up with repeat MRI when hemorrhage products clear to better visualize the lesion    MIRIAN Martinez  [facility] [place]  6/1/2025, 9:54 AM   Spectre [id number]      A total of 30 minutes of visit time (exclusible of billable procedures) was administered.  > 50 % of time spent counseling/coordinating care     Is this a shared or split note between Advanced Practice Provider and Physician? Yes   Examined with nurse practitioner  Case discussed  Patient found slumped over in her car  Likely seizure  Imaging shows a left occipital intracerebral hemorrhage which is small  MRI shows is possibly cavernous malformation  She will need some follow-up imaging in the future  Patient currently getting EEG  Spoke with neurocritical care  Following       [1]   Past Medical History:   Anxiety state, unspecified    Asthma (HCC)    Asthma, mild intermittent (HCC)    Depression    Medication    Extrinsic asthma, unspecified    Positive PPD, treated    rifampin 6 months at HD   [2]   Past Surgical History:  Procedure Laterality Date    Other surgical history Right     4 surgeries to eye   [3]   Allergies  Allergen Reactions    Penicillins     Sulfa Antibiotics    [4]   Medications Prior to Admission   Medication Sig Dispense Refill Last Dose/Taking    busPIRone 15 MG Oral Tab Take 1 tablet (15 mg total) by mouth 4 (four) times daily.   Taking    DULoxetine 30 MG Oral Cap DR Particles Take by mouth daily.   Taking   [5]   Current Facility-Administered Medications   Medication Dose Route Frequency    levoFLOXacin in dextrose 5% (Levaquin) 750 mg/150mL IVPB premix 750 mg  750 mg Intravenous Q24H    norepinephrine (Levophed) 4 mg/250mL infusion premix  0.5-50 mcg/min Intravenous Continuous    sodium chloride 0.9% infusion   Intravenous Continuous    levETIRAcetam (Keppra) 500 mg/5mL injection 1,000 mg  1,000 mg Intravenous Q12H    morphINE PF 2 MG/ML injection 1 mg  1 mg Intravenous Q2H PRN    Or    morphINE PF 2 MG/ML injection 2 mg  2 mg Intravenous Q2H PRN    Or    morphINE PF 4 MG/ML injection 4 mg  4 mg Intravenous Q2H PRN     acetaminophen (Tylenol Extra Strength) tab 500 mg  500 mg Oral Q4H PRN    acetaminophen (Tylenol) tab 650 mg  650 mg Oral Q4H PRN    Or    HYDROcodone-acetaminophen (Norco) 5-325 MG per tab 1 tablet  1 tablet Oral Q4H PRN    Or    HYDROcodone-acetaminophen (Norco) 5-325 MG per tab 2 tablet  2 tablet Oral Q4H PRN    ondansetron (Zofran) 4 MG/2ML injection 4 mg  4 mg Intravenous Q6H PRN    prochlorperazine (Compazine) 10 MG/2ML injection 5 mg  5 mg Intravenous Q8H PRN    polyethylene glycol (PEG 3350) (Miralax) 17 g oral packet 17 g  17 g Oral Daily PRN    sennosides (Senokot) tab 17.2 mg  17.2 mg Oral Nightly PRN    bisacodyl (Dulcolax) 10 MG rectal suppository 10 mg  10 mg Rectal Daily PRN    fleet enema (Fleet) rectal enema 133 mL  1 enema Rectal Once PRN    propofol (Diprivan) 10 mg/mL infusion premix  5-50 mcg/kg/min Intravenous Continuous    dexmedeTOMIDine in sodium chloride 0.9% (Precedex) 400 mcg/100mL infusion premix  0.2-1.5 mcg/kg/hr (Dosing Weight) Intravenous Continuous

## 2025-06-02 PROBLEM — F32.A DEPRESSION: Status: ACTIVE | Noted: 2025-06-02

## 2025-06-02 PROBLEM — N17.9 AKI (ACUTE KIDNEY INJURY): Status: ACTIVE | Noted: 2025-06-02

## 2025-06-02 LAB
ANION GAP SERPL CALC-SCNC: 13 MMOL/L (ref 0–18)
BASOPHILS # BLD AUTO: 0.07 X10(3) UL (ref 0–0.2)
BASOPHILS NFR BLD AUTO: 0.4 %
BUN BLD-MCNC: 12 MG/DL (ref 9–23)
CALCIUM BLD-MCNC: 9.1 MG/DL (ref 8.7–10.6)
CHLORIDE SERPL-SCNC: 114 MMOL/L (ref 98–112)
CO2 SERPL-SCNC: 20 MMOL/L (ref 21–32)
CREAT BLD-MCNC: 1.16 MG/DL (ref 0.55–1.02)
EGFRCR SERPLBLD CKD-EPI 2021: 65 ML/MIN/1.73M2 (ref 60–?)
EOSINOPHIL # BLD AUTO: 0.36 X10(3) UL (ref 0–0.7)
EOSINOPHIL NFR BLD AUTO: 2.1 %
ERYTHROCYTE [DISTWIDTH] IN BLOOD BY AUTOMATED COUNT: 13 %
GLUCOSE BLD-MCNC: 81 MG/DL (ref 70–99)
HCT VFR BLD AUTO: 38.1 % (ref 35–48)
HGB BLD-MCNC: 13.2 G/DL (ref 12–16)
IMM GRANULOCYTES # BLD AUTO: 0.09 X10(3) UL (ref 0–1)
IMM GRANULOCYTES NFR BLD: 0.5 %
LYMPHOCYTES # BLD AUTO: 2.96 X10(3) UL (ref 1–4)
LYMPHOCYTES NFR BLD AUTO: 17.1 %
MCH RBC QN AUTO: 31.4 PG (ref 26–34)
MCHC RBC AUTO-ENTMCNC: 34.6 G/DL (ref 31–37)
MCV RBC AUTO: 90.7 FL (ref 80–100)
MONOCYTES # BLD AUTO: 1.05 X10(3) UL (ref 0.1–1)
MONOCYTES NFR BLD AUTO: 6.1 %
NEUTROPHILS # BLD AUTO: 12.79 X10 (3) UL (ref 1.5–7.7)
NEUTROPHILS # BLD AUTO: 12.79 X10(3) UL (ref 1.5–7.7)
NEUTROPHILS NFR BLD AUTO: 73.8 %
OSMOLALITY SERPL CALC.SUM OF ELEC: 303 MOSM/KG (ref 275–295)
PLATELET # BLD AUTO: 227 10(3)UL (ref 150–450)
POTASSIUM SERPL-SCNC: 3.6 MMOL/L (ref 3.5–5.1)
POTASSIUM SERPL-SCNC: 4.4 MMOL/L (ref 3.5–5.1)
RBC # BLD AUTO: 4.2 X10(6)UL (ref 3.8–5.3)
SODIUM SERPL-SCNC: 147 MMOL/L (ref 136–145)
WBC # BLD AUTO: 17.3 X10(3) UL (ref 4–11)

## 2025-06-02 PROCEDURE — 99291 CRITICAL CARE FIRST HOUR: CPT | Performed by: OTHER

## 2025-06-02 PROCEDURE — 99231 SBSQ HOSP IP/OBS SF/LOW 25: CPT | Performed by: NEUROLOGICAL SURGERY

## 2025-06-02 RX ORDER — POTASSIUM CHLORIDE 1500 MG/1
40 TABLET, EXTENDED RELEASE ORAL EVERY 4 HOURS
Status: COMPLETED | OUTPATIENT
Start: 2025-06-02 | End: 2025-06-02

## 2025-06-02 RX ORDER — HEPARIN SODIUM 5000 [USP'U]/ML
5000 INJECTION, SOLUTION INTRAVENOUS; SUBCUTANEOUS EVERY 12 HOURS
Status: DISCONTINUED | OUTPATIENT
Start: 2025-06-02 | End: 2025-06-03

## 2025-06-02 RX ORDER — DULOXETIN HYDROCHLORIDE 30 MG/1
30 CAPSULE, DELAYED RELEASE ORAL DAILY
Status: DISCONTINUED | OUTPATIENT
Start: 2025-06-02 | End: 2025-06-03

## 2025-06-02 RX ORDER — IPRATROPIUM BROMIDE AND ALBUTEROL SULFATE 2.5; .5 MG/3ML; MG/3ML
3 SOLUTION RESPIRATORY (INHALATION) EVERY 4 HOURS PRN
Status: DISCONTINUED | OUTPATIENT
Start: 2025-06-02 | End: 2025-06-03

## 2025-06-02 NOTE — PROCEDURES
LONG-TERM VIDEO EEG REPORT;    Reason for Examination: Seizures    Technical Summary:   18 Channels of EEG and 1 Channel of EKG was performed utilizing internation 10/20 method. Motion, muscle and machine artifacts were noted.       Recording Start date/time: 05/31/2025 at 1800  Recording end date/time: 06/01/2025 at 1759      Background Activity:   The background activity consisted of 8 Hz waveforms, reactive to eye opening/ external stimulation.    Abnormality:  During the recording, intermittent, generalized, slow sharp waves, most prominent in left frontal region was noted. No electrographic seizures noted.   Throughout the recording low to medium voltage, polymorphic, 3 to 6 Hz slow activity was noted diffusely over both hemispheres'    Activation:    Hyperventilation:   Not Performed.    Photic Stimulation:  Driving response seen.No       Sleep:  Stage I sleep seen.     Impression:  This is an Abnormal prolonged Video EEG study.   Rare generalized sharp waves, as noted above. No electrographic seizures.   Mild to moderate diffuse slowing into delta and theta range was noted.  This constellation of findings can be seen in encephalopathy due to metabolic/toxic etiology, medication effects or diffuse cerebral injury.  Clinical correlation is recommended.        Elia Mackay MD  [facility].

## 2025-06-02 NOTE — PROGRESS NOTES
Assumed care at 1745  A/O x 4. Neuros q4. Wears glasses.  RA  NSR on tele  VSS. SBP goal 100-150.  Complains of pain on her chest where CPR was performed. Declines tylenol.   Oriented to room. Seizure precautions in place. Instructed to call with all needs. Will continue with plan of care.

## 2025-06-02 NOTE — PROGRESS NOTES
[facility]   part of [facility] Hospitalist Progress Note     Claire Goff Patient Status:  Inpatient    1995 MRN [id number]   Location [facility] 6NE-A Attending Caitlin Foss MD   Hosp Day # 2 PCP Siobhan Sierra MD     Subjective:     Patient seen and examined.   Extubated yest  Awake alert followig commands  talking  Mild sob, cough  Tmax 100.9  Family at bedside    Objective:    Review of Systems:   10 point ROS completed and was negative, except for pertinent positive and negatives stated in subjective.    Vital signs:  Temp:  [97.2 °F (36.2 °C)-100.9 °F (38.3 °C)] 99 °F (37.2 °C)  Pulse:  [47-76] 67  Resp:  [10-26] 23  BP: ()/(62-93) 129/73  SpO2:  [87 %-100 %] 94 %  FiO2 (%):  [40 %] 40 %  General: intubated, sedated  HEENT: Normocephalic atraumatic. Moist mucous membranes. EOM-I. PERRLA. Anicteric.  Neck: No lymphadenopathy. No JVD. No carotid bruits.  Respiratory: Clear to auscultation bilaterally. No wheezes. No rhonchi.  Cardiovascular: S1, S2. Regular rate and rhythm. No murmurs, rubs or gallops. Equal pulses.   Chest and Back: No tenderness or deformity.  Abdomen: Soft, nontender, nondistended.  Positive bowel sounds. No rebound, guarding or organomegaly.        Diagnostic Data:    Labs:  Recent Labs   Lab 25  0953 25   WBC 19.3* 16.8* 17.3*   HGB 16.2* 14.2 13.2   .5* 91.2 90.7   .0 241.0 227.0   BAND 5  --   --        Recent Labs   Lab 25  0954 25  0423 25   * 127* 81   BUN 13 16 12   CREATSERUM 1.36* 1.41* 1.16*   CA 10.3 8.6* 9.1   ALB 5.4*  --   --     143 147*   K 4.0 4.0 3.6    113* 114*   CO2 <10.0* 19.0* 20.0*   ALKPHO 60  --   --    AST 76*  --   --    ALT 74*  --   --    BILT 0.3  --   --    TP 8.3*  --   --        Estimated Creatinine Clearance: 69 mL/min (A) (based on SCr of 1.16 mg/dL (H)).    No results for input(s): \"PTP\", \"INR\" in the  last 168 hours.         COVID-19 Lab Results    COVID-19  No results found for: \"COVID19\"    Pro-Calcitonin  No results for input(s): \"PCT\" in the last 168 hours.    Cardiac  No results for input(s): \"TROP\", \"PBNP\" in the last 168 hours.    Creatinine Kinase  No results for input(s): \"CK\" in the last 168 hours.    Inflammatory Markers  No results for input(s): \"CRP\", \"AREN\", \"LDH\", \"DDIMER\" in the last 168 hours.    Imaging: Imaging data reviewed in Epic.    Medications: Scheduled Medications[1]    Assessment & Plan:    Claire Goff is a 29 year old female with PMH significant for anxiety, asthma, depression, presents with her family after being found unresponsive, with possible cardiac arrest.       Unresponsive in field, possible seizure  Acute hypoxic resp failure, unable to protect airway 2/2 neurologic event and possible aspiration penumonitis  -CPR was done in field, unclear how long, patient intubated in field  -sp extubation 6/1/25  -Pulm following >> apprec recs  -fu sputum cx  -cont empiric abx  -rec IS use     New seizure-like activity  -Found unresponsive with generalized tonic-clonic activity  -Loaded with Keppra in ED, continue for now  -Check EEG  -Frequent neurochecks  -PT/OT/ST  -Neurocritical care consulted-appreciate recommendations     New left and right hypodensities-concern for hemorrhage/SDH  -CT reviewed  -MRI/MRA reviewed - showed bleed possible cavernous malformation  -Neurosurgery consulted-appreciate recommendations >> no surg plans >> repeat MRI to better visualize lesion  -Neurocritical care following >> apprec recs  -cont keppra     Resp / metabolic acidosis - resolved  Lactic acidosis  -monitor O2 and Cos  -renal function appears healthy  -monitor urine output    Leukocytosis  -wbc 19 >>16.8 >> 17.3  -send sputum cx  -empiric abx          Quality:  DVT Prophylaxis: scds  CODE status: FULL CODE  Liriano: no  If COVID testing is negative, may discontinue isolation: yes      Plan of  care discussed with patients family, all questions answered.           Caitlin Foss MD  [facility] Hospitalist  Pager [phone]  Answering Service number: [phone]                    [1]    potassium chloride  40 mEq Oral Q4H    levofloxacin  750 mg Intravenous Q24H    levETIRAcetam  1,000 mg Intravenous Q12H

## 2025-06-02 NOTE — PLAN OF CARE
Assumed care of patient at approximately 0730. Pt A&Ox4.  Pt c/o restless legs, NCC aware. Pt maintaining saturations on room air . On telemetry, NSR/SB. SBP goal 100-150. Pt reports c/o headache and generalized pain, PRN medications given. Pt up using rolling walker x1 assist. EEG/Heri removed this AM. Pt and familyupdated on plan of care and verbalized understanding.     Addendum: Pt to be transferred to [facility]. Report given to Cheri KESSLER, all questions answered. Pt transferred with personal belongings.     Problem: Patient/Family Goals  Goal: Patient/Family Long Term Goal  Description: Patient's Long Term Goal: Discharge with adequate resources    Interventions:  - Assistance from CM/SW if needed  - See additional Care Plan goals for specific interventions  Outcome: Progressing  Goal: Patient/Family Short Term Goal  Description: Patient's Short Term Goal: Remain comfortable     Interventions:   - PRN medications  - See additional Care Plan goals for specific interventions  Outcome: Progressing     Problem: NEUROLOGICAL - ADULT  Goal: Achieves stable or improved neurological status  Description: INTERVENTIONS  - Assess for and report changes in neurological status  - Initiate measures to prevent increased intracranial pressure  - Maintain blood pressure and fluid volume within ordered parameters to optimize cerebral perfusion and minimize risk of hemorrhage  - Monitor temperature, glucose, and sodium. Initiate appropriate interventions as ordered  Outcome: Progressing  Goal: Absence of seizures  Description: INTERVENTIONS  - Monitor for seizure activity  - Administer anti-seizure medications as ordered  - Monitor neurological status  Outcome: Progressing  Goal: Achieves maximal functionality and self care  Description: INTERVENTIONS  - Monitor swallowing and airway patency with patient fatigue and changes in neurological status  - Encourage and assist patient to increase activity and self care with guidance from  PT/OT  - Encourage visually impaired, hearing impaired and aphasic patients to use assistive/communication devices  Outcome: Progressing

## 2025-06-02 NOTE — PROGRESS NOTES
[facility]  Neurocritical Care   Progress Note     Subjective:   Extubated yesterday, on room air. cEEG read pending.  Febrile overnight.    Vitals:   Temp:  [97.2 °F (36.2 °C)-100.9 °F (38.3 °C)] 99 °F (37.2 °C)  Pulse:  [47-76] 67  Resp:  [10-26] 23  BP: ()/(62-93) 129/73  SpO2:  [87 %-100 %] 94 %  FiO2 (%):  [40 %] 40 %  Body mass index is 30.25 kg/m².    Intake/Output:  Intake/Output Summary (Last 24 hours) at 6/2/2025 0904  Last data filed at 6/2/2025 0805  Gross per 24 hour   Intake 1458.58 ml   Output 2150 ml   Net -691.42 ml     Scheduled Meds:Scheduled Medications[1]  Continuous Infusions:Medication Infusions[2]  PRN Meds:PRN Medications[3]      Physical Examination:   General-extubated, appears comfortable  CV- RRR  Resp-no increased work of breath  Neuro-  Mental status- Does not open eyes to voice or stim, does not regard or follow commands  Cranial nerves- pupils equally round and reactive to light, +corneal/cough  Motor/Sens- min w/d x4    Diagnostics:   No results found.    Lab Review     Recent Labs   Lab 05/31/25  0954 06/01/25  0423 06/02/25 0417    143 147*   K 4.0 4.0 3.6    113* 114*   CO2 <10.0* 19.0* 20.0*   * 127* 81   BUN 13 16 12   CREATSERUM 1.36* 1.41* 1.16*     Recent Labs   Lab 05/31/25  0953 06/01/25  0423 06/02/25  0417   WBC 19.3* 16.8* 17.3*   HGB 16.2* 14.2 13.2   .0 241.0 227.0     Assesment/Plan:   29 year old female with a h/o asthma, anxiety do, depression, and polysub abuse p/w seizure-like activity 5/31. Pt was found unresponsive by family with generalized tonic-clonic activity, intubated for airway protection per EMS and brought to ED where w/u revealed ct head with 11mm L occ hyperdensity and possible R tentorial sdh, thus pt loaded with keppra     Neuro:  New onset seizure- pt reportedly found unresponsive with generalized tonic-clonic activity.   Pt with risk factors of stressors, h/o polysub abuse (though denies any  current use), and new L occ hyperdensity on CT.   cEEG overnight read pending.  Possible DC this afternoon pending read and stability on current seizure meds   Continue Keppra 1 g twice daily long-term, tolerating well currently, discussed possibly switching meds if baseline depression/anxiety worsens   Cont neurochecks/pt/ot/st.  Given loss of consciousness, patient should not drive, swim alone, bathe alone, climb ladders, use bicycles or operate dangerous machinery for 6 months and/or until cleared by a physician as an outpatient   Follow up with neurology in 4-6 weeks, call with further questions or concerns     L occ hyperdensity and possible R tentorial SDH- Mri brain w/wo c/f L occ subacute hemorrhage with possible underlying cavernoma. No acute intervention per Neurosurg.   F/u imaging as outpt in 6-8 weeks  ICH score = 1 for GCS  Depression/anxiety-  Resume duloxetine today, buspirone tomorrow  Cardiac: SBP goal 100-150  Pulmonary:  Satting well on room air, encourage IS use   Renal:  MERCEDEZ- Improving, intervention: continue IVFs, monitor BUN/Cr  Hypokalemia - K being replaced this AM     GI:  ADAT  Heme/ID:  Febrile         - Leukocytosis stable, on empiric abx per Pulm: levaquin is not ideal as it can lower seizure threshold however patient tolerating and EEG stable so ok to continue  Endocrine/Rheum:  Monitor glucose, sliding scale insulin prn  Checklist   - Lines: PIVs   - DVT Prophylaxis: SCDs and intervention:HSQ starting tonight    - Diet:  ADAT   - IVF: NS - continue x24 hours    - Electrolytes: Replete per protocol   - Code Status: FULL    Dispo: CNICU possible transfer this afternoon pending EEG read and neuro stability    36 minutes of critical care time (exclusive of billable procedures) was administered to manage and/or prevent neurologic instability. This involved direct patient intervention, complex decision making, and/or extensive discussions with the patient, family, and clinical  staff.    Gaurav Wilson MD  Neurocritical Care  [facility]         [1]    potassium chloride  40 mEq Oral Q4H    levofloxacin  750 mg Intravenous Q24H    levETIRAcetam  1,000 mg Intravenous Q12H   [2]    sodium chloride      sodium chloride Stopped (06/01/25 1500)   [3]   ipratropium-albuterol    acetaminophen **OR** acetaminophen    labetalol    hydrALAzine    ondansetron **OR** metoclopramide    morphINE **OR** morphINE **OR** morphINE    polyethylene glycol (PEG 3350)    sennosides    bisacodyl    fleet enema

## 2025-06-02 NOTE — PROGRESS NOTES
[facility]    Claire Goff Patient Status:  Inpatient    1995 MRN [id number]   Location [facility] 6NE-A Attending Caitlin Foss MD   Hosp Day # 2 PCP Siobhan Sierra MD     SUBJECTIVE:Pt extubated yesterday.  Had fevers overnight.  Complains of some mild SOB and cough productive of sputum       OBJECTIVE:  /76   Pulse 62   Temp 98.8 °F (37.1 °C)   Resp 22   Ht 5' 7\" (1.702 m)   Wt 193 lb 2 oz (87.6 kg)   SpO2 97%   BMI 30.25 kg/m²   O2 requirement: RA     I/O last 3 completed shifts:  In: 2908.6 [P.O.:480; I.V.:2278.6; IV PIGGYBACK:150]  Out: 3210 [Urine:2935; Emesis/NG output:275]  No intake/output data recorded.     Current Medications: Current Hospital Medications[1]      Physical Exam:                          General: alert, cooperative, in NAD                          HEENT: oropharynx clear without erythema or exudates, moist mucous membranes                          Lungs: Clear to auscultation bilaterally, no wheezes or crackles                           Chest wall: No tenderness or deformity.                          Heart: Regular rate and rhythm, normal S1S2                          Abdomen: soft, non-tender, non-distended, positive BS.                          Extremity: No clubbing or cyanosis. no edema                          Skin: No rashes or lesions.       Lab Results   Component Value Date    WBC 17.3 2025    RBC 4.20 2025    HGB 13.2 2025    HCT 38.1 2025    MCV 90.7 2025    MCH 31.4 2025    MCHC 34.6 2025    RDW 13.0 2025    .0 2025     Lab Results   Component Value Date     2025    K 3.6 2025     2025    CO2 20.0 2025    BUN 12 2025    CREATSERUM 1.16 2025    GLU 81 2025    CA 9.1 2025     No results found for: \"PT\", \"INR\"       Imaging: I have independently visualized all relevant chest imaging in PACS.  I agree with the radiology  interpretation except where noted.       ASSESSMENT/PLAN:  Acute hypoxic resp failure: secondary to neurological event and perhaps aspiration pneumonia vs pneumonitis   - intubated 5/31, extubated 6/1  - CXR with perihilar interstitial infiltrates  - on empiric levaquin for now given pcn allergy   - f/u cx and adjust as able   Mild intermittent asthma  -albuterol PRN  Seizure, abnl ct head: concern for occipital bleed and possible cavernous malformation   - per neuro and neurosurgery   - on keppra   Fen: ADAT   Prophy: scd   Dispo: full code. ICU.    Otilia Gilbert MD  6/2/2025  7:55 AM,         [1]   Current Facility-Administered Medications:     potassium chloride (Klor-Con M20) tab 40 mEq, 40 mEq, Oral, Q4H    levoFLOXacin in dextrose 5% (Levaquin) 750 mg/150mL IVPB premix 750 mg, 750 mg, Intravenous, Q24H    sodium chloride 0.9% infusion, , Intravenous, Continuous    acetaminophen (Tylenol) tab 650 mg, 650 mg, Oral, Q4H PRN **OR** acetaminophen (Tylenol) rectal suppository 650 mg, 650 mg, Rectal, Q4H PRN    labetalol (Trandate) 5 mg/mL injection 10 mg, 10 mg, Intravenous, Q10 Min PRN    hydrALAzine (Apresoline) 20 mg/mL injection 10 mg, 10 mg, Intravenous, Q2H PRN    ondansetron (Zofran) 4 MG/2ML injection 4 mg, 4 mg, Intravenous, Q6H PRN **OR** metoclopramide (Reglan) 5 mg/mL injection 5 mg, 5 mg, Intravenous, Q8H PRN    norepinephrine (Levophed) 4 mg/250mL infusion premix, 0.5-50 mcg/min, Intravenous, Continuous    sodium chloride 0.9% infusion, , Intravenous, Continuous    levETIRAcetam (Keppra) 500 mg/5mL injection 1,000 mg, 1,000 mg, Intravenous, Q12H    morphINE PF 2 MG/ML injection 1 mg, 1 mg, Intravenous, Q2H PRN **OR** morphINE PF 2 MG/ML injection 2 mg, 2 mg, Intravenous, Q2H PRN **OR** morphINE PF 4 MG/ML injection 4 mg, 4 mg, Intravenous, Q2H PRN    polyethylene glycol (PEG 3350) (Miralax) 17 g oral packet 17 g, 17 g, Oral, Daily PRN    sennosides (Senokot) tab 17.2 mg, 17.2 mg, Oral, Nightly  PRN    bisacodyl (Dulcolax) 10 MG rectal suppository 10 mg, 10 mg, Rectal, Daily PRN    fleet enema (Fleet) rectal enema 133 mL, 1 enema, Rectal, Once PRN    propofol (Diprivan) 10 mg/mL infusion premix, 5-50 mcg/kg/min, Intravenous, Continuous    dexmedeTOMIDine in sodium chloride 0.9% (Precedex) 400 mcg/100mL infusion premix, 0.2-1.5 mcg/kg/hr (Dosing Weight), Intravenous, Continuous

## 2025-06-02 NOTE — PLAN OF CARE
Assumed care @ 1930. Patient is A&O x4. Follows command. On tele-SB, NSR. On RA. SBP goal 100-150, maintaining on own. Tmax 100.9, tylenol prn and ice packs given. Neuro checks Q2H, see flowsheet. cEEG-no seizure-like activity noted. Emesis x1, zofran given. Liriano in place. Call light within reach. All needs met at this time.

## 2025-06-02 NOTE — OCCUPATIONAL THERAPY NOTE
OCCUPATIONAL THERAPY EVALUATION - INPATIENT     Room Number: 6602/6602-A  Evaluation Date: 6/2/2025  Type of Evaluation: Initial  Presenting Problem: Cerebral hemorrhage    Physician Order: IP Consult to Occupational Therapy  Reason for Therapy: ADL/IADL Dysfunction and Discharge Planning    OCCUPATIONAL THERAPY ASSESSMENT   Patient is currently functioning below baseline with bathing, lower body dressing, bed mobility, transfers, and performing household tasks. Prior to admission, patient's baseline is independent.  Patient is requiring supervision as a result of the following impairments: cognitive deficits ( ) and visual deficits. Occupational Therapy will continue to follow for duration of hospitalization.    Patient will benefit from continued skilled OT Services at discharge to promote prior level of function.  Anticipate patient will return home with OP OT    History Related to Current Admission: Patient is a 29 year old female admitted on 5/31/2025 with Presenting Problem: Cerebral hemorrhage. Co-Morbidities : anxiety, asthma, depression, h/o drug abuse    WEIGHT BEARING RESTRICTION       Recommendations for nursing staff:   Transfers: x1   Toileting location: bathroom    EVALUATION SESSION:  Patient Start of Session: bed    FUNCTIONAL TRANSFER ASSESSMENT  Sit to Stand: Edge of Bed  Edge of Bed: Stand-by Assist  Toilet Transfer: Stand-by Assist    BED MOBILITY  Supine to Sit : Stand-by Assist    BALANCE ASSESSMENT     FUNCTIONAL ADL ASSESSMENT  Grooming Standing: Contact Guard Assist  LB Dressing Seated: Stand-by Assist  LB Dressing Standing: Contact Guard Assist    ACTIVITY TOLERANCE: good                         O2 SATURATIONS       COGNITION  Overall Cognitive Status:  WFL - within functional limits  Mostly intact, at end of session takes ~1min to be able to read time on analog clock in room.     Upper Extremity   ROM: within functional limits   Strength: within functional limits   Coordination  Gross  motor: intact  Fine motor: intact  Sensation: Light touch:  intact    EDUCATION PROVIDED  Patient Education : Role of Occupational Therapy; Plan of Care; Discharge Recommendations; Functional Transfer Techniques; Posture/Positioning; Energy Conservation; Proper Body Mechanics  Patient's Response to Education: Verbalized Understanding    Equipment used: none  Demonstrates functional use, Would benefit from additional trial      Therapist comments: Received supine in bed, performs bed mobility, sit to stand tx, functional ambulation no device all at SBA level. No strength or coordination deficits noted in UE. Pt initially reporting double vision but corrects when using prescription glasses. Pt takes ~1min to read analog clock in room at end of session, says having difficulty with doing so but ultimately gets time right. Further cognitive testing next session.    Patient End of Session: Up in chair, Needs met, Call light within reach, RN aware of session/findings, All patient questions and concerns addressed, Hospital anti-slip socks, Alarm set, Family present    OCCUPATIONAL PROFILE    HOME SITUATION  Type of Home: Condo  Home Layout: One level  Lives With: Spouse, Other (Comment) (brother just moved in as well)               Occupation/Status:      Drives: Yes  Patient Regularly Uses: Glasses, Other (Comment) (reports that without her glasses, she has intermittent double vision (baseline))    Prior Level of Function: independent     SUBJECTIVE   Calm and cooparative     PAIN ASSESSMENT  Rating: Unable to rate  Location: headache       OBJECTIVE  Precautions: HOB elevated 30 degrees, Aspiration, Seizure, Bed/chair alarm, Other (Comment) (-150)  Fall Risk: High fall risk    ASSESSMENTS    AM-PAC ‘6-Clicks’ Inpatient Daily Activity Short Form  -   Putting on and taking off regular lower body clothing?: A Little  -   Bathing (including washing, rinsing, drying)?: A Little  -    Toileting, which includes using toilet, bedpan or urinal? : A Little  -   Putting on and taking off regular upper body clothing?: A Little  -   Taking care of personal grooming such as brushing teeth?: None  -   Eating meals?: None    AM-PAC Score:  Score: 20  Approx Degree of Impairment: 38.32%  Standardized Score (AM-PAC Scale): 42.03    ADDITIONAL TESTS     NEUROLOGICAL FINDINGS      COGNITION ASSESSMENTS     PLAN     OT Treatment Plan: Balance activities, Energy conservation/work simplification techniques, ADL training, Functional transfer training, Patient/Family education, Patient/Family training  Rehab Potential : Good  Frequency: 3x/week  Number of Visits to Meet Established Goals: 2    ADL Goals   Patient will perform all ADLs: independently    Functional Transfer Goals  Patient will perform all functional transfers:  independently    UE Exercise Program Goal  Patient will be independent with bilateral AROM HEP (home exercise program).    Additional Goals      Therapist Goals  Further cognitive testing (clock draw, etc.)  Patient Evaluation Complexity Level:   Occupational Profile/Medical History LOW - Brief history including review of medical or therapy records    Specific performance deficits impacting engagement in ADL/IADL LOW  1 - 3 performance deficits    Client Assessment/Performance Deficits LOW - No comorbidities nor modifications of tasks    Clinical Decision Making LOW - Analysis of occupational profile, problem-focused assessments, limited treatment options    Overall Complexity LOW     OT Session Time: 25 minutes  Self-Care Home Management:  minutes  Therapeutic Activity: 15 minutes  Neuromuscular Re-education:  minutes  Therapeutic Exercise:  minutes  Cognitive Skills:  minutes  Sensory Integrative:  minutes  Orthotic Management and Training:  minutes  Can add/delete any of these

## 2025-06-02 NOTE — PROCEDURES
LONG-TERM VIDEO EEG REPORT;     Reason for Examination: Seizures     Technical Summary:   18 Channels of EEG and 1 Channel of EKG was performed utilizing internation 10/20 method. Motion, muscle and machine artifacts were noted.         Recording Start date/time: 06/01/2025 at 1800  Recording end date/time: 06/02/2025 at 1005        Background Activity:   The background activity consisted of 8 Hz waveforms, reactive to eye opening/ external stimulation.     Abnormality:  During the recording, intermittent, generalized, slow sharp waves, most prominent in left frontal region was noted. No electrographic seizures noted.   Throughout the recording low to medium voltage, polymorphic, 3 to 6 Hz slow activity was noted diffusely over both hemispheres'     Activation:     Hyperventilation:   Not Performed.     Photic Stimulation:  Driving response seen.No        Sleep:  Stage I sleep seen.      Impression:  This is an Abnormal prolonged Video EEG study.   Rare generalized sharp waves, as noted above. No electrographic seizures.   Mild to moderate diffuse slowing into delta and theta range was noted.  This constellation of findings can be seen in encephalopathy due to metabolic/toxic etiology, medication effects or diffuse cerebral injury.  Clinical correlation is recommended.           Elia Mackay MD  [facility]

## 2025-06-02 NOTE — PROGRESS NOTES
[facility]   Neurosurgery Progress Note    Claire Goff Patient Status:  Inpatient    1995 MRN [id number]   [facility] 6NE-A Attending Caitlin Foss MD   Hosp Day # 2 PCP Siobhan Sierra MD     Chief Complaint:  Left occipital hemorrhage.     Subjective:  Patient with low grade fevers overnight, Tmax 100.9. Patient on empiric Levaquin per pulmonology. Pt examined, complains of frontal headache and restless/uncomfortable legs. She denies a history of restless leg syndrome. She does have a history of opioid abuse and denies any recent opioid use. She denies any visual abnormalities other than some mild blurred vision due to not wearing her glasses.    Objective/Physical Exam:    Vital Signs:  Blood pressure 129/73, pulse 67, temperature 99 °F (37.2 °C), resp. rate 23, height 67\", weight 193 lb 2 oz (87.6 kg), SpO2 94%, not currently breastfeeding.    Respiratory:  Respirations nonlabored    CV:  NSR on tele    General: NAD, Speech Fluent, Mood Appropriate    Neurologic: This patient is bright and awake, answers all questions appropriately. Able to follow commands.  Face is symmetric. PERRLA +3, sluggish on the right. EOMI.  VFF. CN 2-12 GI,  Sensation to light touch is intact bilaterally.  No Pronator Drift.      Upper extremity strength:      Deltoid  Biceps  Triceps        Right 5 5 5 5     Left 5 5 5 5      Lower extremity strength:      Iliospoas  Hamstrings  Quads  D-flexion  P-flexion     Right 5 5 5 5 5     Left 5 5 5 5 5          Labs:  Lab Results   Component Value Date    WBC 17.3 2025    HGB 13.2 2025    HCT 38.1 2025    .0 2025    CREATSERUM 1.16 2025    BUN 12 2025     2025    K 3.6 2025     2025    CO2 20.0 2025    GLU 81 2025    CA 9.1 2025    PGLU 101 2025       Imaging:  MRI BRAIN MRA BRAIN+MRA NECK (ALL W+WO) (CPT=70553/49036/85380)  Result Date:  5/31/2025  CONCLUSION:  1. Although not definitive, the hyperdensity previously seen within the left occipital lobe could represent a 7 x 7 mm cavernous malformation with associated hemorrhage anteriorly measuring 11 x 8 mm.  No definitive enhancement is seen.  The anterior blood products demonstrate T1 shortening consistent with more subacute blood products.  A mild amount of vasogenic edema is seen within the left occipital lobe.  A follow-up contrast-enhanced brain MRI in 6 weeks is recommended to exclude an underlying neoplastic process or other etiologies for the left occipital lobe blood products. 2. The previously questioned acute subdural hemorrhage along the right tentorium is less conspicuous on this examination. 3. No acute infarct or hydrocephalus. 4. No high-grade stenosis, occlusion, aneurysm, or dissection is identified within the major intracranial or cervical arterial vasculature.    LOCATION:  [facility]   Dictated by (CST): Stromberg, LeRoy, MD on 5/31/2025 at 3:30 PM     Finalized by (CST): Stromberg, LeRoy, MD on 5/31/2025 at 3:49 PM       CT BRAIN OR HEAD (CPT=70450)  Result Date: 5/31/2025  CONCLUSION:   1. There is a hyperdensity at the left occipital lobe measuring 11 x 10 mm.  This may represent acute blood products or a hemorrhagic lesion.  A contrast-enhanced brain MRI with and without intravenous contrast is recommended for further assessment.   2. Asymmetric hyperdensity at the right tentorial leaflet may represent a subdural hemorrhage.  This can also be further assessed on the subsequent brain MRI.   Critical results were discussed with Dr. Lawrence at 1120 hours on 5/31/2025. Critical results were read back.     LOCATION:  [facility]   Dictated by (CST): Stromberg, LeRoy, MD on 5/31/2025 at 11:14 AM     Finalized by (CST): Stromberg, LeRoy, MD on 5/31/2025 at 11:21 AM       XR CHEST AP PORTABLE  (CPT=71045)  Result Date: 5/31/2025  CONCLUSION:  Heart size within normal limits.  Symmetric  perihilar interstitial opacities may reflect pulmonary edema or inflammatory process.  No discrete airspace consolidation.  The pleural spaces are clear.  Endotracheal tube tip terminates approximately 4.5 cm from the tawanna.  Enteric catheter terminates in the expected location of the gastric body.   LOCATION:  [facility]      Dictated by (CST): Hardik Dunlap MD on 5/31/2025 at 10:22 AM     Finalized by (CST): Hardik Dunlap MD on 5/31/2025 at 10:23 AM          Assessment:  28 y/o female who presented with seizure activity, small left occipital hemorrhage with suspected underlying cavernous malformation     Plan:  No role for surgical intervention   Pain control for headaches as ordered per primary team   AED's per neurology   Continue medical managemet per NCC and hospitalist teams   DVT prophylaxis: SCD's  Follow up with repeat MRI in 6-8 weeks and outpatient neurosurgery appt to review imaging     Patient seen and examined. Plan of care discussed with the patient who verbalized understanding and agreed to the plan. Discussed with Dr. Vasquez.       Cecily Porras PA-C   [facility]  6/2/2025, 9:23 AM      A total of 15 minutes of visit time (exclusible of billable procedures) was administered.  > 50 % of time spent counseling/coordinating care     Is this a shared or split note between Advanced Practice Provider and Physician? Yes    Dragon speech recognition software was used to prepare this note. If a word or phrase is confusing, it is likely due to a failure of recognition. Please contact me with any questions or clarifications.   Patient seen and examined  Case reviewed  She is doing really well  She is awake alert  Following commands in all fours  Spoke with neurocritical care  Will sign off  Please call with any questions or concerns  She can follow-up in 6 to 8 weeks with a repeat MRI to evaluate possible cavernoma  All questions were answered  Patient and family appreciative

## 2025-06-02 NOTE — DISCHARGE PLANNING
Patient follow-up appointment information:     Visit Type: Intracerebral Hemorrhage   Date of TIA/Stroke: 5/31/2025  Type of Stroke: Hemorrhage   Patient to follow-up: 4-6 week follow up   OP Neurologist: Any avail   New patient to neurology service: Yes   Anticipated discharge (if known): TBD   Discharge disposition (if known): TBD   Was this an ICU Stay?: Yes, Currently room 6602        RN Stroke Navigator team  [phone]

## 2025-06-02 NOTE — PHYSICAL THERAPY NOTE
PHYSICAL THERAPY EVALUATION - INPATIENT     Room Number: 6602/6602-A  Evaluation Date: 6/2/2025  Type of Evaluation: Initial  Physician Order: PT Eval and Treat    Presenting Problem: AMS, cerebral hemorrhage and sz  Co-Morbidities : anxiety, asthma, depression, h/o drug abuse  Reason for Therapy: Mobility Dysfunction and Discharge Planning    MRI/MRA brain and neck 5/31/25-CONCLUSION:    1. Although not definitive, the hyperdensity previously seen within the left occipital lobe could represent a 7 x 7 mm cavernous malformation with associated hemorrhage anteriorly measuring 11 x 8 mm.  No definitive enhancement is seen.  The anterior   blood products demonstrate T1 shortening consistent with more subacute blood products.  A mild amount of vasogenic edema is seen within the left occipital lobe.  A follow-up contrast-enhanced brain MRI in 6 weeks is recommended to exclude an underlying   neoplastic process or other etiologies for the left occipital lobe blood products.   2. The previously questioned acute subdural hemorrhage along the right tentorium is less conspicuous on this examination.   3. No acute infarct or hydrocephalus.   4. No high-grade stenosis, occlusion, aneurysm, or dissection is identified within the major intracranial or cervical arterial vasculature.    EEG 6/1/25-Impression:  This is an Abnormal prolonged Video EEG study.   Rare generalized sharp waves, as noted above. No electrographic seizures.   Mild to moderate diffuse slowing into delta and theta range was noted.  This constellation of findings can be seen in encephalopathy due to metabolic/toxic etiology, medication effects or diffuse cerebral injury.  Clinical correlation is recommended.    PHYSICAL THERAPY ASSESSMENT   Patient is a 29 year old female admitted 5/31/2025 for AMS, cerebral hemorrhage and sz.  Prior to admission, patient's baseline is completely indep c all ADLs, IADLs, driving and works full time as a .   Patient is currently functioning below baseline with transfers, gait, stair negotiation, standing prolonged periods, and performing household tasks.  Patient is requiring contact guard assist as a result of the following impairments: impaired static/dynamic standing balance, impaired coordination, and decreased muscular endurance.  Physical Therapy will continue to follow for duration of hospitalization.    Patient will benefit from continued skilled PT Services at discharge to promote prior level of function and safety with additional support and return home with OP PT.    PLAN DURING HOSPITALIZATION  Nursing Mobility Recommendation : 1 Assist  PT Device Recommendation: Gait belt  PT Treatment Plan: Bed mobility, Body mechanics, Coordination, Endurance, Energy conservation, Patient education, Family education, Gait training, Strengthening, Transfer training, Balance training  Rehab Potential : Good  Frequency (Obs): 3x/week     CURRENT GOALS    Goal #1 Patient is able to demonstrate supine - sit EOB @ level: supervision     Goal #2 Patient is able to demonstrate transfers EOB to/from Chair/Wheelchair at assistance level: modified independent     Goal #3 Patient is able to ambulate 600 feet with  at assistance level: modified independent     Goal #4 Pt will demo ability to negotiate 1 flight of stairs c supervision by DC   Goal #5    Goal #6    Goal Comments: Goals established on 6/2/2025      PHYSICAL THERAPY MEDICAL/SOCIAL HISTORY  History related to current admission: Patient is a 29 year old female admitted on 5/31/2025 from parking lot for AMS and sz (c tongue biting).  Pt diagnosed with AMS, cerebral hemorrhage and sz.    HOME SITUATION  Type of Home: Condo  Home Layout: One level  Stairs to Enter : 0                  Lives With: Spouse, Other (Comment) (brother just moved in as well)    Drives: Yes   Patient Regularly Uses: Glasses, Other (Comment) (reports that without her glasses, she has intermittent  double vision (baseline))      Prior Level of [place]: patient's baseline is completely indep c all ADLs, IADLs, driving and works full time as a ; of note- pt's father just passed away last week    SUBJECTIVE  \"My legs have been bothering me a lot, they won't stop moving\"  -after several questions, it was noted that pt has been moving her legs (it is not involuntary) d/t them being very uncomfortable    OBJECTIVE  Precautions: HOB elevated 30 degrees, Aspiration, Seizure, Bed/chair alarm, Other (Comment) (-150)  Fall Risk: High fall risk    WEIGHT BEARING RESTRICTION     PAIN ASSESSMENT  Rating: Unable to rate  Location: posterior head/occipital region  Management Techniques: Relaxation, Repositioning, Breathing techniques    COGNITION  Overall Cognitive Status:  WFL - within functional limits  Arousal/Alertness:  delayed responses to stimuli  Orientation Level:  oriented to place, oriented to time, and oriented to person  Following Commands:  follows multi-step commands inconsistently, follows multistep commands with increased time, and follows multistep commands with repetition  Safety Judgement:  decreased awareness of need for assistance and decreased awareness of need for safety    RANGE OF MOTION AND STRENGTH ASSESSMENT  Upper extremity ROM and strength are -see OT eval    Lower extremity ROM is within functional limits     Lower extremity strength is within functional limits     BALANCE  Static Sitting: Fair  Dynamic Sitting: Fair  Static Standing: Fair -  Dynamic Standing: Fair -    ADDITIONAL TESTS  Additional Tests: Modified [place]              Modified [place]: 3                  ACTIVITY TOLERANCE                         O2 WALK       NEUROLOGICAL FINDINGS  Neurological Findings: Coordination - Heel to Shin, Coordination - Rapid Alternating Movement, Sensation     Coordination - Heel to Shin: Symmetrical  Coordination - Rapid Alternating Movement: Symmetrical  Sensation: BLE  symmetrical/intact to light touch         AM-PAC '6-Clicks' INPATIENT SHORT FORM - BASIC MOBILITY  How much difficulty does the patient currently have...  Patient Difficulty: Turning over in bed (including adjusting bedclothes, sheets and blankets)?: A Little   Patient Difficulty: Sitting down on and standing up from a chair with arms (e.g., wheelchair, bedside commode, etc.): A Little   Patient Difficulty: Moving from lying on back to sitting on the side of the bed?: A Little   How much help from another person does the patient currently need...   Help from Another: Moving to and from a bed to a chair (including a wheelchair)?: A Little   Help from Another: Need to walk in hospital room?: A Little   Help from Another: Climbing 3-5 steps with a railing?: A Little     AM-PAC Score:  Raw Score: 18   Approx Degree of Impairment: 46.58%   Standardized Score (AM-PAC Scale): 43.63   CMS Modifier (G-Code): CK    FUNCTIONAL ABILITY STATUS  Gait Assessment   Functional Mobility/Gait Assessment  Gait Assistance: Contact guard assist  Distance (ft): 500  Assistive Device: None  Pattern: Shuffle, L Foot flat, R Foot flat, Comment (intermittent sway c M/L deviation; B shld elevation and decr B arm swing grossly; rigid trunk)    Skilled Therapy Provided   Pt presents to PT lying supine in bed c spouse and brother at bedside. She is drowsy, agreeable to participate. Noting mild post head discomfort near occiput and some double vision- however double vision completely went away when donning her glasses. Intermittent word finding difficulty and challenged c reading an analog clock during session. T/f supine/sit c CGA to the EOB. She is able to scoot/reposition c incr time and some cues. Mild c/o dizziness, however BP WFL grossly throughout session. Sit/stand c min A initially and able to wt shift and take some steps c CGA to the BSC. When sitting, pt's spouse donned her shoes for her. Following this, pt amb 500' c no device and amb  CGA c the above gait/balance deviations. Pt reporting her legs felt strange, however this progressively improved with more walking, along c her gait speed. She then returned to her room and t/f to sitting up in [facility] c CGA. BLE were elev and RN remained c her post session c spouse/brother.     Bed Mobility:  Rolling: NT  Supine to sit: CGA   Sit to supine: NT     Transfer Mobility:  Sit to stand: min A initially and then CGA   Stand to sit: CGA  Gait = CGA x500', no device    Therapist's Comments: gait/balance activities; test coordination/balance while in standing    Exercise/Education Provided:  Bed mobility  Body mechanics  Functional activity tolerated  Gait training  Posture  Transfer training    Patient End of Session: Up in chair, Needs met, Call light within reach, With  staff, RN aware of session/findings, All patient questions and concerns addressed, Family present, Discussed recommendations with /      Patient Evaluation Complexity Level:  History High - 3 or more personal factors and/or co-morbidities   Examination of body systems Moderate - addressing a total of 3 or more elements   Clinical Presentation  Moderate - Evolving   Clinical Decision Making Moderate Complexity       PT Session Time: 32 minutes  Gait Training: 10 minutes  Therapeutic Activity: 5 minutes

## 2025-06-03 VITALS
WEIGHT: 195 LBS | DIASTOLIC BLOOD PRESSURE: 72 MMHG | RESPIRATION RATE: 15 BRPM | OXYGEN SATURATION: 95 % | HEART RATE: 70 BPM | BODY MASS INDEX: 30.61 KG/M2 | HEIGHT: 67 IN | TEMPERATURE: 99 F | SYSTOLIC BLOOD PRESSURE: 124 MMHG

## 2025-06-03 LAB
ANION GAP SERPL CALC-SCNC: 15 MMOL/L (ref 0–18)
BASOPHILS # BLD AUTO: 0.07 X10(3) UL (ref 0–0.2)
BASOPHILS NFR BLD AUTO: 0.6 %
BUN BLD-MCNC: 9 MG/DL (ref 9–23)
CALCIUM BLD-MCNC: 9.4 MG/DL (ref 8.7–10.6)
CHLORIDE SERPL-SCNC: 109 MMOL/L (ref 98–112)
CO2 SERPL-SCNC: 20 MMOL/L (ref 21–32)
CREAT BLD-MCNC: 0.83 MG/DL (ref 0.55–1.02)
EGFRCR SERPLBLD CKD-EPI 2021: 97 ML/MIN/1.73M2 (ref 60–?)
EOSINOPHIL # BLD AUTO: 0.4 X10(3) UL (ref 0–0.7)
EOSINOPHIL NFR BLD AUTO: 3.6 %
ERYTHROCYTE [DISTWIDTH] IN BLOOD BY AUTOMATED COUNT: 12.7 %
GLUCOSE BLD-MCNC: 104 MG/DL (ref 70–99)
HCT VFR BLD AUTO: 39.7 % (ref 35–48)
HGB BLD-MCNC: 13.5 G/DL (ref 12–16)
IMM GRANULOCYTES # BLD AUTO: 0.04 X10(3) UL (ref 0–1)
IMM GRANULOCYTES NFR BLD: 0.4 %
LYMPHOCYTES # BLD AUTO: 1.46 X10(3) UL (ref 1–4)
LYMPHOCYTES NFR BLD AUTO: 13.1 %
MCH RBC QN AUTO: 31.1 PG (ref 26–34)
MCHC RBC AUTO-ENTMCNC: 34 G/DL (ref 31–37)
MCV RBC AUTO: 91.5 FL (ref 80–100)
MONOCYTES # BLD AUTO: 0.57 X10(3) UL (ref 0.1–1)
MONOCYTES NFR BLD AUTO: 5.1 %
NEUTROPHILS # BLD AUTO: 8.57 X10 (3) UL (ref 1.5–7.7)
NEUTROPHILS # BLD AUTO: 8.57 X10(3) UL (ref 1.5–7.7)
NEUTROPHILS NFR BLD AUTO: 77.2 %
OSMOLALITY SERPL CALC.SUM OF ELEC: 297 MOSM/KG (ref 275–295)
PLATELET # BLD AUTO: 224 10(3)UL (ref 150–450)
POTASSIUM SERPL-SCNC: 3.7 MMOL/L (ref 3.5–5.1)
RBC # BLD AUTO: 4.34 X10(6)UL (ref 3.8–5.3)
SODIUM SERPL-SCNC: 144 MMOL/L (ref 136–145)
WBC # BLD AUTO: 11.1 X10(3) UL (ref 4–11)

## 2025-06-03 RX ORDER — POTASSIUM CHLORIDE 1500 MG/1
40 TABLET, EXTENDED RELEASE ORAL ONCE
Status: DISCONTINUED | OUTPATIENT
Start: 2025-06-03 | End: 2025-06-03

## 2025-06-03 RX ORDER — LEVOFLOXACIN 750 MG/1
750 TABLET, FILM COATED ORAL
Status: DISCONTINUED | OUTPATIENT
Start: 2025-06-04 | End: 2025-06-03

## 2025-06-03 RX ORDER — LEVETIRACETAM 1000 MG/1
1000 TABLET ORAL 2 TIMES DAILY
Qty: 60 TABLET | Refills: 2 | Status: SHIPPED | OUTPATIENT
Start: 2025-06-03

## 2025-06-03 RX ORDER — ESCITALOPRAM OXALATE 20 MG/1
20 TABLET ORAL DAILY
COMMUNITY

## 2025-06-03 RX ORDER — ESCITALOPRAM OXALATE 20 MG/1
20 TABLET ORAL EVERY MORNING
Status: DISCONTINUED | OUTPATIENT
Start: 2025-06-03 | End: 2025-06-03

## 2025-06-03 RX ORDER — LEVETIRACETAM 500 MG/1
1000 TABLET ORAL 2 TIMES DAILY
Status: DISCONTINUED | OUTPATIENT
Start: 2025-06-03 | End: 2025-06-03

## 2025-06-03 RX ORDER — LEVOFLOXACIN 750 MG/1
750 TABLET, FILM COATED ORAL DAILY
Qty: 2 TABLET | Refills: 0 | Status: SHIPPED | OUTPATIENT
Start: 2025-06-04 | End: 2025-06-06

## 2025-06-03 RX ORDER — POTASSIUM CHLORIDE 1.5 G/1.58G
40 POWDER, FOR SOLUTION ORAL ONCE
Status: COMPLETED | OUTPATIENT
Start: 2025-06-03 | End: 2025-06-03

## 2025-06-03 NOTE — SPIRITUAL CARE NOTE
Spiritual Care Visit Note    Patient Name: Claire Goff Date of Spiritual Care Visit: 25   : 1995 Primary Dx: Cerebral hemorrhage (HCC)       Referred By:      Spiritual Care Taxonomy:    Intended Effects: Helping someone feel comforted    Methods: Offer support    Interventions: Provide grief resources, Provide Grief Processing Session, Discuss concerns    Visit Type/Summary:     - Spiritual Care: Offered empathic listening and emotional support. Claire names/describes being shocked at what happened Saturday.  offered input on being with her in the ED when she came to [place] Saturday, and how glad he was to see her doing better. She shared what she remembered happening and tests currently being done. She shared of loss of father one month ago and how upsetting it has been in her life, although she expressed gratitude for  Jaime's parents.  provided resources on grief such as support groups.  Claire seemed interested in that.  Claire expressed appreciation for  visit.  remains available as needed for follow up.    Spiritual Care support can be requested via an Epic consult. For urgent/immediate needs, please contact the On Call  at: [place]: ext [phone]

## 2025-06-03 NOTE — PROGRESS NOTES
NURSING DISCHARGE NOTE    Discharged Home via Wheelchair.  Accompanied by Spouse and Support staff  Belongings Taken by patient/family.    AVS printed and reviewed with patient  PIV removed  Patient discharged in stable condition. Patient driven home by her , Matti.

## 2025-06-03 NOTE — PROGRESS NOTES
[facility]    Claire Goff Patient Status:  Inpatient    1995 MRN [id number]   Location [facility] 6NE-A Attending Caitlin Foss MD   Hosp Day # 3 PCP Siobhan Sierra MD     SUBJECTIVE:Pt afebrile overnight, remains on RA.      OBJECTIVE:  /84 (BP Location: Right arm)   Pulse 60   Temp 98.5 °F (36.9 °C) (Oral)   Resp (!) 27   Ht 5' 7\" (1.702 m)   Wt 195 lb (88.5 kg)   SpO2 95%   BMI 30.54 kg/m²   O2 requirement: RA     I/O last 3 completed shifts:  In: -   Out: 1650 [Urine:1650]  No intake/output data recorded.     Current Medications: Current Hospital Medications[1]      Physical Exam:                          General: alert, cooperative, in NAD                          HEENT: oropharynx clear without erythema or exudates, moist mucous membranes                          Lungs: Clear to auscultation bilaterally, no wheezes or crackles                           Chest wall: No tenderness or deformity.                          Heart: Regular rate and rhythm, normal S1S2                          Abdomen: soft, non-tender, non-distended, positive BS.                          Extremity: No clubbing or cyanosis. no edema                          Skin: No rashes or lesions.       Lab Results   Component Value Date    WBC 11.1 2025    RBC 4.34 2025    HGB 13.5 2025    HCT 39.7 2025    MCV 91.5 2025    MCH 31.1 2025    MCHC 34.0 2025    RDW 12.7 2025    .0 2025     Lab Results   Component Value Date    K 4.4 2025     No results found for: \"PT\", \"INR\"       Imaging: I have independently visualized all relevant chest imaging in PACS.  I agree with the radiology interpretation except where noted.       ASSESSMENT/PLAN:  Acute hypoxic resp failure: secondary to neurological event and perhaps aspiration pneumonia vs pneumonitis   - intubated , extubated , now on RA  - CXR with perihilar interstitial infiltrates  - on  empiric levaquin for now given pcn allergy, plan for 5 day course   - f/u cx and adjust as able   Mild intermittent asthma  -albuterol PRN  Seizure, abnl ct head: concern for occipital bleed and possible cavernous malformation   - per neuro and neurosurgery   - on keppra   Fen: ADAT   Prophy: scd   Dispo:   -stable for discharge from pul perspective  -pt can follow up with pul APN in 2 weeks     Otilia Gilbert MD           [1]   Current Facility-Administered Medications:     escitalopram (Lexapro) tab 20 mg, 20 mg, Oral, QAM    levETIRAcetam (Keppra) tab 1,000 mg, 1,000 mg, Oral, BID    [START ON 6/4/2025] levoFLOXacin (Levaquin) tab 750 mg, 750 mg, Oral, Daily @ 0700    ipratropium-albuterol (Duoneb) 0.5-2.5 (3) MG/3ML inhalation solution 3 mL, 3 mL, Nebulization, Q4H PRN    heparin (Porcine) 5000 UNIT/ML injection 5,000 Units, 5,000 Units, Subcutaneous, q12h    levoFLOXacin in dextrose 5% (Levaquin) 750 mg/150mL IVPB premix 750 mg, 750 mg, Intravenous, Q24H    acetaminophen (Tylenol) tab 650 mg, 650 mg, Oral, Q4H PRN **OR** acetaminophen (Tylenol) rectal suppository 650 mg, 650 mg, Rectal, Q4H PRN    labetalol (Trandate) 5 mg/mL injection 10 mg, 10 mg, Intravenous, Q10 Min PRN    hydrALAzine (Apresoline) 20 mg/mL injection 10 mg, 10 mg, Intravenous, Q2H PRN    ondansetron (Zofran) 4 MG/2ML injection 4 mg, 4 mg, Intravenous, Q6H PRN **OR** metoclopramide (Reglan) 5 mg/mL injection 5 mg, 5 mg, Intravenous, Q8H PRN    polyethylene glycol (PEG 3350) (Miralax) 17 g oral packet 17 g, 17 g, Oral, Daily PRN    sennosides (Senokot) tab 17.2 mg, 17.2 mg, Oral, Nightly PRN    bisacodyl (Dulcolax) 10 MG rectal suppository 10 mg, 10 mg, Rectal, Daily PRN    fleet enema (Fleet) rectal enema 133 mL, 1 enema, Rectal, Once PRN

## 2025-06-03 NOTE — PROGRESS NOTES
[facility]   part of [facility] Hospitalist Progress Note     Claire Goff Patient Status:  Inpatient    1995 MRN [id number]   Location [facility] 6NE-A Attending Caitlin Foss MD   Hosp Day # 3 PCP Siobhan Sierra MD     Subjective:     Patient seen and examined.   Out of icu yest  Doing well on her own  No O2  Afebrile  Awake alert followig commands  talking  Family at bedside    Objective:    Review of Systems:   10 point ROS completed and was negative, except for pertinent positive and negatives stated in subjective.    Vital signs:  Temp:  [97.3 °F (36.3 °C)-99.3 °F (37.4 °C)] 98.5 °F (36.9 °C)  Pulse:  [52-95] 60  Resp:  [17-28] 27  BP: (124-147)/(73-92) 140/84  SpO2:  [90 %-99 %] 95 %  General: alert oriented x 3  HEENT: Normocephalic atraumatic. Moist mucous membranes. EOM-I. PERRLA. Anicteric.  Neck: No lymphadenopathy. No JVD. No carotid bruits.  Respiratory: Clear to auscultation bilaterally. No wheezes. No rhonchi.  Cardiovascular: S1, S2. Regular rate and rhythm. No murmurs, rubs or gallops. Equal pulses.   Chest and Back: No tenderness or deformity.  Abdomen: Soft, nontender, nondistended.  Positive bowel sounds. No rebound, guarding or organomegaly.        Diagnostic Data:    Labs:  Recent Labs   Lab 25  0953 25   WBC 19.3* 16.8* 17.3*   HGB 16.2* 14.2 13.2   .5* 91.2 90.7   .0 241.0 227.0   BAND 5  --   --        Recent Labs   Lab 25  0954 25  0423 257 25  1426   * 127* 81  --    BUN 13 16 12  --    CREATSERUM 1.36* 1.41* 1.16*  --    CA 10.3 8.6* 9.1  --    ALB 5.4*  --   --   --     143 147*  --    K 4.0 4.0 3.6 4.4    113* 114*  --    CO2 <10.0* 19.0* 20.0*  --    ALKPHO 60  --   --   --    AST 76*  --   --   --    ALT 74*  --   --   --    BILT 0.3  --   --   --    TP 8.3*  --   --   --        Estimated Creatinine Clearance: 69 mL/min (A) (based on  SCr of 1.16 mg/dL (H)).    No results for input(s): \"PTP\", \"INR\" in the last 168 hours.         COVID-19 Lab Results    COVID-19  No results found for: \"COVID19\"    Pro-Calcitonin  No results for input(s): \"PCT\" in the last 168 hours.    Cardiac  No results for input(s): \"TROP\", \"PBNP\" in the last 168 hours.    Creatinine Kinase  No results for input(s): \"CK\" in the last 168 hours.    Inflammatory Markers  No results for input(s): \"CRP\", \"AREN\", \"LDH\", \"DDIMER\" in the last 168 hours.    Imaging: Imaging data reviewed in Epic.    Medications: Scheduled Medications[1]    Assessment & Plan:    Claire Goff is a 29 year old female with PMH significant for anxiety, asthma, depression, presents with her family after being found unresponsive, with possible cardiac arrest.       Unresponsive in field, possible seizure  Acute hypoxic resp failure, unable to protect airway 2/2 neurologic event and possible aspiration penumonitis  -CPR was done in field, unclear how long, patient intubated in field  -sp extubation 6/1/25  -Pulm following >> apprec recs  -fu sputum cx  -cont empiric abx  -rec IS use     New seizure-like activity  -Found unresponsive with generalized tonic-clonic activity  -Loaded with Keppra in ED, continue for now >> cont keppra on dc  -EEG reviewed  -Frequent neurochecks  -PT/OT/ST >> ok for outpt PT  -Neurocritical care consulted-appreciate recommendations     New left and right hypodensities-concern for hemorrhage/SDH  -CT reviewed  -MRI/MRA reviewed - showed bleed possible cavernous malformation  -Neurosurgery consulted-appreciate recommendations >> no surg plans >> repeat MRI in 6-8 wks to better visualize lesion  -Neurocritical care following >> apprec recs  -cont keppra     Resp / metabolic acidosis - resolved  Lactic acidosis  -monitor O2 and Cos  -renal function appears healthy  -monitor urine output    Leukocytosis  -wbc 19 >>16.8 >> 17.3  -send sputum cx  -empiric abx          Quality:  DVT  Prophylaxis: scds  CODE status: FULL CODE  Liriano: no  If COVID testing is negative, may discontinue isolation: yes      DISPO:  Dc planning in process  OK for dc from medical standpoint  Nees pulm, neuro clearance prior to dc  MRI brain in 6-8 wks  Keppra  PT as outpt  PT recs cont skilled therapy with return home and OP PT    Plan of care discussed with patients family, all questions answered.           Caitlin Foss MD  [facility] Hospitalist  Pager [phone]  Answering Service number: [phone]                    [1]    escitalopram  20 mg Oral QAM    heparin  5,000 Units Subcutaneous q12h    levofloxacin  750 mg Intravenous Q24H    levETIRAcetam  1,000 mg Intravenous Q12H

## 2025-06-03 NOTE — PLAN OF CARE
A/O x 4, Neuro checks Q 4. Photosensitivity  Seizure prec, no seizure acitivtiy  Room air,   Tele SB  BP within parameters  Voiding with assistance, 1 assist  Headache, given tylenol PO PRN  K replaced yesterday, redraw 4.4  Reg diet  IV ABX prophylactic for poss aspiration PTA  Saline locked  Reg diet  Significant other at bedside  All needs met    Problem: Patient/Family Goals  Goal: Patient/Family Long Term Goal  Description: Patient's Long Term Goal: Discharge with adequate resources    Interventions:  - Assistance from CM/SW if needed  - See additional Care Plan goals for specific interventions  Outcome: Progressing  Goal: Patient/Family Short Term Goal  Description: Patient's Short Term Goal: Remain comfortable     Interventions:   - PRN medications  - See additional Care Plan goals for specific interventions  Outcome: Progressing     Problem: NEUROLOGICAL - ADULT  Goal: Achieves stable or improved neurological status  Description: INTERVENTIONS  - Assess for and report changes in neurological status  - Initiate measures to prevent increased intracranial pressure  - Maintain blood pressure and fluid volume within ordered parameters to optimize cerebral perfusion and minimize risk of hemorrhage  - Monitor temperature, glucose, and sodium. Initiate appropriate interventions as ordered  Outcome: Progressing  Goal: Absence of seizures  Description: INTERVENTIONS  - Monitor for seizure activity  - Administer anti-seizure medications as ordered  - Monitor neurological status  Outcome: Progressing  Goal: Achieves maximal functionality and self care  Description: INTERVENTIONS  - Monitor swallowing and airway patency with patient fatigue and changes in neurological status  - Encourage and assist patient to increase activity and self care with guidance from PT/OT  - Encourage visually impaired, hearing impaired and aphasic patients to use assistive/communication devices  Outcome: Progressing

## 2025-06-03 NOTE — CM/SW NOTE
06/03/25 0800   CM/SW Referral Data   Referral Source Nurse   Reason for Referral Protocol order set   Specify order set Other   Informant EMR     HOME SITUATION  Type of Home: Condo  Home Layout: One level  Stairs to Enter : 0                  Lives With: Spouse, Other (Comment) (brother just moved in as well)    Drives: Yes   Patient Regularly Uses: Glasses, Other (Comment) (reports that without her glasses, she has intermittent double vision (baseline))       Prior Level of [place] per PT eval: patient's baseline is completely indep c all ADLs, IADLs, driving and works full time as a ; of note- pt's father just passed away last week  (Per PT eval)     SW received protocol order for  eval. Pt is a 28 y/o female admitted with Cerebral hemorrhage. Chart reviewed and noted therapy worked with pt. Anticipated therapy need: Home with Outpatient Rehab    No discharge needs identified at this time. SW/JOSEP to remain available for support and/or discharge planning.    MORENO Gibson  Discharge Planner

## 2025-06-03 NOTE — PHYSICAL THERAPY NOTE
PHYSICAL THERAPY TREATMENT NOTE - INPATIENT    Room Number: 3615/3615-A     Session: 1     Number of Visits to Meet Established Goals: 3    Presenting Problem: AMS, cerebral hemorrhage and sz  Co-Morbidities : anxiety, asthma, depression, h/o drug abuse    PHYSICAL THERAPY MEDICAL/SOCIAL HISTORY  History related to current admission: Patient is a 29 year old female admitted on 5/31/2025 from parking lot for AMS and sz (c tongue biting).  Pt diagnosed with AMS, cerebral hemorrhage and sz.     HOME SITUATION  Type of Home: Condo  Home Layout: One level  Stairs to Enter : 0                  Lives With: Spouse, Other (Comment) (brother just moved in as well)    Drives: Yes   Patient Regularly Uses: Glasses, Other (Comment) (reports that without her glasses, she has intermittent double vision (baseline))       Prior Level of [place]: patient's baseline is completely indep c all ADLs, IADLs, driving and works full time as a ; of note- pt's father just passed away last week       PHYSICAL THERAPY ASSESSMENT     Patient is currently functioning near baseline with bed mobility, transfers, gait, and stair negotiation.    Patient currently does not meet criteria for skilled inpatient physical therapy services, however patient will continue to benefit from QID ambulation with nursing staff.  Pt is discharged from IP PT services.  RN aware to re-order if there is a decline in mobility status.      Education provided on energy conservation/pacing - Pt denied vision changes or nausea with movement.     BP prior to activity - 140/84  BP after walk/stairs - 147/96    Patient at discharge to promote prior level of function and safety with additional support and return home with OP PT.    PLAN DURING HOSPITALIZATION  Nursing Mobility Recommendation : 1 Assist  PT Device Recommendation: Gait belt  PT Treatment Plan: Bed mobility, Body mechanics, Coordination, Endurance, Energy conservation, Patient education,  Family education, Gait training, Strengthening, Transfer training, Balance training  Frequency (Obs): 3x/week     CURRENT GOALS     Goal #1 Patient is able to demonstrate supine - sit EOB @ level: supervision      Goal #2 Patient is able to demonstrate transfers EOB to/from Chair/Wheelchair at assistance level: modified independent      Goal #3 Patient is able to ambulate 600 feet with  at assistance level: modified independent      Goal #4 Pt will demo ability to negotiate 1 flight of stairs c supervision by DC   Goal #5     Goal #6     Goal Comments: Goals established on 6/2/2025       6/3/2025 all goals achieved at SBA level or above    SUBJECTIVE  \"Yeah I almost need to wear sunglasses sometimes\"    OBJECTIVE  Precautions: HOB elevated 30 degrees, Aspiration, Seizure, Bed/chair alarm, Other (Comment) (-150)    WEIGHT BEARING RESTRICTION     PAIN ASSESSMENT   Rating: Unable to rate  Location: pressure near occipital ridget - photosensitivity  Management Techniques: Relaxation, Repositioning, Breathing techniques    BALANCE                                                                                                                       Static Sitting: Fair  Dynamic Sitting: Fair           Static Standing: Fair -  Dynamic Standing: Fair -    ACTIVITY TOLERANCE                         O2 WALK       AM-PAC '6-Clicks' INPATIENT SHORT FORM - BASIC MOBILITY  How much difficulty does the patient currently have...  Patient Difficulty: Turning over in bed (including adjusting bedclothes, sheets and blankets)?: None   Patient Difficulty: Sitting down on and standing up from a chair with arms (e.g., wheelchair, bedside commode, etc.): None   Patient Difficulty: Moving from lying on back to sitting on the side of the bed?: None   How much help from another person does the patient currently need...   Help from Another: Moving to and from a bed to a chair (including a wheelchair)?: None   Help from Another: Need to  walk in hospital room?: None   Help from Another: Climbing 3-5 steps with a railing?: A Little     AM-PAC Score:  Raw Score: 23   Approx Degree of Impairment: 11.2%   Standardized Score (AM-PAC Scale): 56.93   CMS Modifier (G-Code): CI    FUNCTIONAL ABILITY STATUS  Gait Assessment   Functional Mobility/Gait Assessment  Gait Assistance: Supervision  Distance (ft): 500  Assistive Device: None  Pattern:  (rigid head posture - cautious of head movement causing headache.  no overt loss of balance - able to steer around obstacles without collision)  Stairs: Stairs  How Many Stairs: 4 (limited by antibiotic IV line)  Device: 1 Rail  Assist: Supervision  Pattern: Ascend and Descend  Ascend and Descend : Step to    Skilled Therapy Provided    Bed Mobility:  Rolling: Mod I   Supine<>Sit: Mod I   Sit<>Supine: NT     Transfer Mobility:  Sit<>Stand: SBA   Stand<>Sit: SBA   Gait: SBA no device    Therapist's Comments: spouse witnessed session - supportive      Patient End of Session: Up in chair, Needs met, Call light within reach, RN aware of session/findings, All patient questions and concerns addressed, Hospital anti-slip socks, Alarm set, Family present    PT Session Time: 15 minutes  Gait Training: 15 minutes  Therapeutic Activity: 0 minutes  Therapeutic Exercise: 0 minutes   Neuromuscular Re-education: 0 minutes

## 2025-06-03 NOTE — SLP NOTE
SPEECH/LANGUAGE/COGNITIVE EVALUATION & DYSPHAGIA FOLLOW UP - INPATIENT    Admission Date: 5/31/2025  Evaluation Date: 06/03/25    Reason for Referral: R/O aspiration    ASSESSMENT & PLAN   ASSESSMENT & IMPRESSION  Pt seen for dysphagia tx to assess tolerance with recommended diet, ensure proper utilization of aspiration precautions and provide pt/family education.  Patient able to swallow thin liquids and demonstrated no overt signs of aspiration. No complaints reported with swallowing.    SLP then introduced communication evaluation given reports of concerns with difficulty with verbal expression. Patient and  both report that her verbal expression have improved over the past day. Patient described deficits as paraphasic errors vs anomia. Patient did note some increased processing time but also that that is improving. Patient achieved score of 100 on WAB bedside aphasia exam. Aside from increased processing time, she denied any perceived deficits with her communication,    Discussed results and allowing continued time for spontaneous improvement. Discussed SLP entering order for OP SLP should  patient feel she remains below baseline after allowing for further improvement upon discharge home. She verbalized understanding and agreement. OP SLP order placed.     Assessment(s) Administered: WAB Bedside Score     WAB Bedside Score: 100          Aphasic Depression Rating Scale Administered: Not applicable       Patient Experiencing Pain: No                           GOALS  Goal #1 The patient will tolerate regular consistency and thin liquids without overt signs or symptoms of aspiration with 90 % accuracy over 1-2 session(s).  Met   Goal #2 The patient/family/caregiver will demonstrate understanding and implementation of aspiration precautions and swallow strategies independently over 1-2 session(s).     Met   Goal #3 Pt will complete communication evaluation.   Met     Prior Living Situation: Home with  spouse  Prior Level of Function: Independent      Imaging Results:   MRI Brain/MRA Brain from 5/31/25 revealed:  CONCLUSION:    1. Although not definitive, the hyperdensity previously seen within the left occipital lobe could represent a 7 x 7 mm cavernous malformation with associated hemorrhage anteriorly measuring 11 x 8 mm.  No definitive enhancement is seen.  The anterior   blood products demonstrate T1 shortening consistent with more subacute blood products.  A mild amount of vasogenic edema is seen within the left occipital lobe.  A follow-up contrast-enhanced brain MRI in 6 weeks is recommended to exclude an underlying   neoplastic process or other etiologies for the left occipital lobe blood products.   2. The previously questioned acute subdural hemorrhage along the right tentorium is less conspicuous on this examination.   3. No acute infarct or hydrocephalus.   4. No high-grade stenosis, occlusion, aneurysm, or dissection is identified within the major intracranial or cervical arterial vasculature.          LOCATION:  [facility]         Dictated by (CST): Stromberg, LeRoy, MD on 5/31/2025 at 3:30 PM       Finalized by (CST): Stromberg, LeRoy, MD on 5/31/2025 at 3:49 PM       Patient/Family Goals: to get better and go home    Interdisciplinary Communication: Discussed with RN    Patient, family and/or caregiver has been informed and has taken part in this evaluation and plan of treatment and have been advised and agree on the findings and goals.      FOLLOW UP  Treatment Plan/Recommendations: No further inpatient SLP service warranted     Follow Up Needed (Documentation Required): No  SLP Follow-up Date:  (.)    Thank you for your referral.  If you have any questions please contact Tyrone Saleem MS [facility]-SLP  Spectra [phone]

## 2025-06-03 NOTE — PLAN OF CARE
Assumed care at 0730  A/O x 4. Q4 neuro checks. See flowsheets for full details.   RA  NSR on tele  VSS  Complains of headache - tylenol given per MAR  Continent x 2. No BM since prior to admission but declines miralax.  Up SBA  Levaquin q24  L and R AC PIV saline locked  Regular diet  Cardiac EP - K replaced per protocol  Daily weight  Seizure precautions  Aspiration precautions  All needs met. Pt and pts spouse and brother at bedside updated. Will continue with plan of care.    Problem: Patient/Family Goals  Goal: Patient/Family Long Term Goal  Description: Patient's Long Term Goal: Discharge with adequate resources    Interventions:  - Assistance from CM/SW if needed  - See additional Care Plan goals for specific interventions  Outcome: Progressing  Goal: Patient/Family Short Term Goal  Description: Patient's Short Term Goal: Remain comfortable     Interventions:   - PRN medications  - See additional Care Plan goals for specific interventions  Outcome: Progressing     Problem: NEUROLOGICAL - ADULT  Goal: Achieves stable or improved neurological status  Description: INTERVENTIONS  - Assess for and report changes in neurological status  - Initiate measures to prevent increased intracranial pressure  - Maintain blood pressure and fluid volume within ordered parameters to optimize cerebral perfusion and minimize risk of hemorrhage  - Monitor temperature, glucose, and sodium. Initiate appropriate interventions as ordered  Outcome: Progressing  Goal: Absence of seizures  Description: INTERVENTIONS  - Monitor for seizure activity  - Administer anti-seizure medications as ordered  - Monitor neurological status  Outcome: Progressing  Goal: Achieves maximal functionality and self care  Description: INTERVENTIONS  - Monitor swallowing and airway patency with patient fatigue and changes in neurological status  - Encourage and assist patient to increase activity and self care with guidance from PT/OT  - Encourage visually  impaired, hearing impaired and aphasic patients to use assistive/communication devices  Outcome: Progressing

## 2025-06-04 ENCOUNTER — PATIENT OUTREACH (OUTPATIENT)
Dept: CASE MANAGEMENT | Age: 30
End: 2025-06-04

## 2025-06-04 NOTE — PROGRESS NOTES
Hospital follow up.    José Miguel Vasquez M.D.  Neurology; Vascular Neurology  [address] [address]  [phone]  Tuesday 7/22 @9:20  Existing appointment.    Siobhan Sierra MD  Family Medicine  [address] [facility]  [address]  [place] [address]  [phone]  Wednesday 6/4 @10:00  Existing appointment.    Attempt #1:  Left message on voicemail for patient to call transitions specialist back to schedule follow up appointments. Provided Transitions specialist scheduling phone number [phone].

## 2025-06-05 NOTE — PROGRESS NOTES
Neuro/Stroke; confirming Neurology appointment (discharged 06/03)    Dr José Miguel Vasquez  Neurology  [address] [place] [address]  [phone]  Confirming appointment; patient has existing appointment Tue 07/22 @9:20am    Attempt #2:  Left message on voicemail for patient to call transitions specialist back to schedule follow up appointments. Provided Transitions specialist scheduling phone number [phone].

## 2025-06-06 NOTE — PROGRESS NOTES
Neuro/Stroke; confirming Neurology appointment (discharged 06/03)     Dr José Miguel Vasquez  Neurology  [address] [place] [id number]  [phone]  Confirming appointment; patient has existing appointment Tue 07/22 @9:20am    Attempt #3:  Left message on voicemail for patient to call transitions specialist back to schedule follow up appointments. Provided Transitions specialist scheduling phone number [phone]. Closing encounter. Will re-open if patient returns call.   Patient called right back  Confirmed w/pt  Closing encounter

## 2025-06-09 NOTE — DISCHARGE SUMMARY
TRISH  HOSPITALIST  DISCHARGE SUMMARY     Claire Goff Patient Status:  Inpatient    1995 MRN [id number]   [facility] 3NE-A Attending No att. providers found   Hosp Day # 3 PCP Siobhan Sierra MD     Date of Admission: 2025  Date of Discharge: 6/3/2025  Discharge Disposition: Home or Self Care    Discharge Diagnosis:   Unresponsive in field, possible seizure  Acute hypoxic resp failure, unable to protect airway 2/2 neurologic event and possible aspiration penumonitis  New seizure-like activity  New left and right hypodensities-concern for hemorrhage/SDH  Resp / metabolic acidosis - resolved  Lactic acidosis  Leukocytosis    History of Present Illness:   Claire Goff is a 29 year old female with PMH significant for anxiety, asthma, depression, presents with her family after being found unresponsive, with possible cardiac arrest.  Patient has a remote history of drug abuse, concern for overdose.  Per family patient has not used in 10 years.  Patient was moving with her  and was in a normal state of health laughing joking driving a car, parked and  arrived 5 to 10 minutes after she had.  In the car patient was known to be grimacing, blood coming out of mouth, not responsive, with upper extremity posturing.  911 was called and CPR was started, unclear if pulse was present or not but patient was intubated in the field.  She was given Narcan without any relief.   reports that patient's father  last week and this is caused patient significant anxiety with sadness.  He was on Xanax and he thinks that the patient may have taken a few Xanax from her father's supply.  In the ED CT head shows left occipital lobe hyperdensity which may represent hemorrhagic lesion.  With asymmetric hypodensity in the right area possible subdural.  Routine labs show creatinine 1.36, ABG with pH of 6.81, lactic acid on ABG 16.5, WBC 19.3, hemoglobin 16.2.  Neurosurgery and neurocritical  care were consulted.  Patient transferred to ICU in stable condition on ventilator.  Plans for MRI/MRA.  Family at bedside in the ED.       Brief Synopsis:     Unresponsive in field, possible seizure  Acute hypoxic resp failure, unable to protect airway 2/2 neurologic event and possible aspiration penumonitis  -CPR was done in field, unclear how long, patient intubated in field  -sp extubation 6/1/25  -Pulm following >> apprec recs  -fu sputum cx  -cont empiric abx  -rec IS use     New seizure-like activity  -Found unresponsive with generalized tonic-clonic activity  -Loaded with Keppra in ED, continue for now >> cont keppra on dc  -EEG reviewed  -Frequent neurochecks  -PT/OT/ST >> ok for outpt PT  -Neurocritical care consulted-appreciate recommendations     New left and right hypodensities-concern for hemorrhage/SDH  -CT reviewed  -MRI/MRA reviewed - showed bleed possible cavernous malformation  -Neurosurgery consulted-appreciate recommendations >> no surg plans >> repeat MRI in 6-8 wks to better visualize lesion  -Neurocritical care following >> apprec recs  -cont keppra     Resp / metabolic acidosis - resolved  Lactic acidosis  -monitor O2 and Cos  -renal function appears healthy  -monitor urine output     Leukocytosis  -wbc 19 >>16.8 >> 17.3  -send sputum cx  -empiric abx        DC INSTRUCTIONS  Dc planning in process  OK for dc from medical standpoint  Nees pulm, neuro clearance prior to dc  MRI brain in 6-8 wks  Keppra  PT as outpt  PT recs cont skilled therapy with return home and OP PT     Plan of care discussed with patients family, all questions answered.       Lace+ Score: 47  59-90 High Risk  29-58 Medium Risk  0-28   Low Risk       TCM Follow-Up Recommendation:  LACE 29-58: Moderate Risk of readmission after discharge from the hospital.    Procedures during hospitalization:   none    Incidental or significant findings and recommendations (brief descriptions):  none    Lab/Test results pending at  Discharge:   none    Consultants:  PULM  Neurosurgery  Neurocritical care    Discharge Medication List:     Discharge Medications        START taking these medications        Instructions Prescription details   levetiracetam 1000 MG Tabs  Commonly known as: KEPPRA      Take 1 tablet (1,000 mg total) by mouth 2 (two) times daily.   Quantity: 60 tablet  Refills: 2            CONTINUE taking these medications        Instructions Prescription details   busPIRone 15 MG Tabs  Commonly known as: Buspar      Take 1 tablet (15 mg total) by mouth 4 (four) times daily.   Refills: 0     escitalopram 20 MG Tabs  Commonly known as: Lexapro      Take 1 tablet (20 mg total) by mouth in the morning.   Refills: 0            ASK your doctor about these medications        Instructions Prescription details   levoFLOXacin 750 MG Tabs  Commonly known as: Levaquin  Ask about: Should I take this medication?      Take 1 tablet (750 mg total) by mouth daily for 2 days.   Stop taking on: June 6, 2025  Quantity: 2 tablet  Refills: 0               Where to Get Your Medications        These medications were sent to [id number] - [address] [phone], [phone]  [address] [place] [address]      Phone: [phone]   levetiracetam 1000 MG Tabs  levoFLOXacin 750 MG Tabs         [facility]   yes    Follow-up appointment:   Siobhan Sierra MD  [address] [facility] [address]  [facility] [address] [phone]    Schedule an appointment as soon as possible for a visit in 1 week(s)      José Miguel Vasquez MD  [address] [place] [address] [id number] [phone]    Go on 7/22/2025  stroke follow up at 9:20 am    Tamara Dickinson, FNP-C  [address]  [facility] [address]  [facility] [id number] [phone]    Follow up in 2 week(s)      Sinan Vasquez MD  [address]  [facility] [address]  [facility] [id number] [phone]    Follow up in 2 week(s)      Appointments for Next 30 Days 6/9/2025 - 7/9/2025       None          Vital signs:  Temp:  [97.3 °F (36.3 °C)-99.3 °F (37.4 °C)] 98.5 °F (36.9 °C)  Pulse:  [52-95] 60  Resp:  [17-28] 27  BP: (124-147)/(73-92) 140/84  SpO2:  [90 %-99 %] 95 %  General: alert oriented x 3  HEENT: Normocephalic atraumatic. Moist mucous membranes. EOM-I. PERRLA. Anicteric.  Neck: No lymphadenopathy. No JVD. No carotid bruits.  Respiratory: Clear to auscultation bilaterally. No wheezes. No rhonchi.  Cardiovascular: S1, S2. Regular rate and rhythm. No murmurs, rubs or gallops. Equal pulses.   Chest and Back: No tenderness or deformity.  Abdomen: Soft, nontender, nondistended.  Positive bowel sounds. No rebound, guarding or organomegaly.  -----------------------------------------------------------------------------------------------  PATIENT DISCHARGE INSTRUCTIONS: See electronic chart    Caitlin Foss MD    Time spent:  > 30 minutes

## (undated) NOTE — LETTER
[address]  [address]  Consent for Procedure/Sedation  Date: ***         Time: ***    {[facility] ivs consent:7291}

## (undated) NOTE — ED AVS SNAPSHOT
Matt Crawford   MRN: [id number]    Department:  [facility] Emergency Department   Date of Visit:  2/14/2019           Disclosure     Insurance plans vary and the physician(s) referred by the ER may not be covered by your plan.  Please contac CARE PHYSICIAN AT ONCE OR RETURN IMMEDIATELY TO THE EMERGENCY DEPARTMENT. If you have been prescribed any medication(s), please fill your prescription right away and begin taking the medication(s) as directed.   If you believe that any of the medications

## (undated) NOTE — ED AVS SNAPSHOT
Bimal Willis   MRN: [id number]    Department:  [facility] Emergency Department   Date of Visit:  2/15/2019           Disclosure     Insurance plans vary and the physician(s) referred by the ER may not be covered by your plan.  Please contac CARE PHYSICIAN AT ONCE OR RETURN IMMEDIATELY TO THE EMERGENCY DEPARTMENT. If you have been prescribed any medication(s), please fill your prescription right away and begin taking the medication(s) as directed.   If you believe that any of the medications